# Patient Record
Sex: FEMALE | Race: WHITE | NOT HISPANIC OR LATINO | Employment: OTHER | URBAN - NONMETROPOLITAN AREA
[De-identification: names, ages, dates, MRNs, and addresses within clinical notes are randomized per-mention and may not be internally consistent; named-entity substitution may affect disease eponyms.]

---

## 2017-03-08 ENCOUNTER — HOSPITAL ENCOUNTER (OUTPATIENT)
Dept: LAB | Facility: MEDICAL CENTER | Age: 62
End: 2017-03-08
Attending: FAMILY MEDICINE
Payer: COMMERCIAL

## 2017-03-08 LAB
ALBUMIN SERPL BCP-MCNC: 3.9 G/DL (ref 3.2–4.9)
ALBUMIN/GLOB SERPL: 1.3 G/DL
ALP SERPL-CCNC: 123 U/L (ref 30–99)
ALT SERPL-CCNC: 56 U/L (ref 2–50)
ANION GAP SERPL CALC-SCNC: 6 MMOL/L (ref 0–11.9)
APPEARANCE UR: ABNORMAL
AST SERPL-CCNC: 32 U/L (ref 12–45)
BACTERIA #/AREA URNS HPF: ABNORMAL /HPF
BILIRUB SERPL-MCNC: 0.3 MG/DL (ref 0.1–1.5)
BILIRUB UR QL STRIP.AUTO: NEGATIVE
BUN SERPL-MCNC: 21 MG/DL (ref 8–22)
CALCIUM SERPL-MCNC: 9 MG/DL (ref 8.5–10.5)
CHLORIDE SERPL-SCNC: 104 MMOL/L (ref 96–112)
CHOLEST SERPL-MCNC: 222 MG/DL (ref 100–199)
CO2 SERPL-SCNC: 31 MMOL/L (ref 20–33)
COLOR UR AUTO: YELLOW
CREAT SERPL-MCNC: 0.74 MG/DL (ref 0.5–1.4)
EPITHELIAL CELLS 1715: ABNORMAL /HPF
GLOBULIN SER CALC-MCNC: 3.1 G/DL (ref 1.9–3.5)
GLUCOSE SERPL-MCNC: 128 MG/DL (ref 65–99)
GLUCOSE UR STRIP.AUTO-MCNC: NEGATIVE MG/DL
HDLC SERPL-MCNC: 44 MG/DL
KETONES UR STRIP.AUTO-MCNC: NEGATIVE MG/DL
LDLC SERPL CALC-MCNC: 106 MG/DL
LEUKOCYTE ESTERASE UR QL STRIP.AUTO: NEGATIVE
MICRO URNS: ABNORMAL
NITRITE UR QL STRIP.AUTO: NEGATIVE
PH UR: 6 [PH]
POTASSIUM SERPL-SCNC: 4.3 MMOL/L (ref 3.6–5.5)
PROT SERPL-MCNC: 7 G/DL (ref 6–8.2)
PROT UR QL STRIP: NEGATIVE MG/DL
RBC #/AREA URNS HPF: ABNORMAL /HPF
RBC UR QL AUTO: NEGATIVE
SODIUM SERPL-SCNC: 141 MMOL/L (ref 135–145)
SP GR UR STRIP.AUTO: 1.02
T3FREE SERPL-MCNC: 2.94 PG/ML (ref 2.4–4.2)
T4 FREE SERPL-MCNC: 0.66 NG/DL (ref 0.53–1.43)
TRIGL SERPL-MCNC: 361 MG/DL (ref 0–149)
TSH SERPL DL<=0.005 MIU/L-ACNC: 3.84 UIU/ML (ref 0.3–3.7)
WBC #/AREA URNS HPF: ABNORMAL /HPF

## 2017-03-08 PROCEDURE — 84443 ASSAY THYROID STIM HORMONE: CPT

## 2017-03-08 PROCEDURE — 84481 FREE ASSAY (FT-3): CPT

## 2017-03-08 PROCEDURE — 81001 URINALYSIS AUTO W/SCOPE: CPT

## 2017-03-08 PROCEDURE — 36415 COLL VENOUS BLD VENIPUNCTURE: CPT

## 2017-03-08 PROCEDURE — 80061 LIPID PANEL: CPT

## 2017-03-08 PROCEDURE — 84439 ASSAY OF FREE THYROXINE: CPT

## 2017-03-08 PROCEDURE — 80053 COMPREHEN METABOLIC PANEL: CPT

## 2017-03-24 ENCOUNTER — HOSPITAL ENCOUNTER (OUTPATIENT)
Dept: LAB | Facility: MEDICAL CENTER | Age: 62
End: 2017-03-24
Attending: FAMILY MEDICINE
Payer: COMMERCIAL

## 2017-03-24 LAB
EST. AVERAGE GLUCOSE BLD GHB EST-MCNC: 126 MG/DL
HBA1C MFR BLD: 6 % (ref 0–5.6)

## 2017-03-24 PROCEDURE — 83036 HEMOGLOBIN GLYCOSYLATED A1C: CPT | Mod: GA

## 2017-03-24 PROCEDURE — 36415 COLL VENOUS BLD VENIPUNCTURE: CPT | Mod: GA

## 2017-06-02 ENCOUNTER — HOSPITAL ENCOUNTER (OUTPATIENT)
Dept: LAB | Facility: MEDICAL CENTER | Age: 62
End: 2017-06-02
Attending: PHYSICIAN ASSISTANT
Payer: COMMERCIAL

## 2017-06-02 ENCOUNTER — HOSPITAL ENCOUNTER (OUTPATIENT)
Dept: LAB | Facility: MEDICAL CENTER | Age: 62
End: 2017-06-02
Attending: INTERNAL MEDICINE
Payer: COMMERCIAL

## 2017-06-02 LAB
FERRITIN SERPL-MCNC: 164.7 NG/ML (ref 10–291)
IRON SATN MFR SERPL: 27 % (ref 15–55)
IRON SERPL-MCNC: 98 UG/DL (ref 40–170)
TIBC SERPL-MCNC: 358 UG/DL (ref 250–450)
TRANSFERRIN SERPL-MCNC: 249 MG/DL (ref 200–370)
VIT B12 SERPL-MCNC: 367 PG/ML (ref 211–911)

## 2017-06-02 PROCEDURE — 82607 VITAMIN B-12: CPT

## 2017-06-02 PROCEDURE — 83921 ORGANIC ACID SINGLE QUANT: CPT

## 2017-06-04 LAB
A1AT SERPL-MCNC: 129 MG/DL (ref 90–200)
CERULOPLASMIN SERPL-MCNC: 28 MG/DL (ref 17–54)
MITOCHONDRIA M2 IGG SER-ACNC: 4.8 UNITS (ref 0–20)
SMA IGG SER-ACNC: 15 UNITS (ref 0–19)

## 2017-06-05 LAB
IGA SERPL-MCNC: 207 MG/DL (ref 68–408)
NUCLEAR IGG SER QL IA: NORMAL
TTG IGG SER IA-ACNC: 2 U/ML (ref 0–5)

## 2017-06-06 LAB — METHYLMALONATE SERPL-SCNC: 0.11 UMOL/L (ref 0–0.4)

## 2017-10-16 ENCOUNTER — OFFICE VISIT (OUTPATIENT)
Dept: URGENT CARE | Facility: PHYSICIAN GROUP | Age: 62
End: 2017-10-16
Payer: COMMERCIAL

## 2017-10-16 VITALS
HEART RATE: 72 BPM | TEMPERATURE: 98.5 F | HEIGHT: 64 IN | SYSTOLIC BLOOD PRESSURE: 130 MMHG | DIASTOLIC BLOOD PRESSURE: 72 MMHG | WEIGHT: 253.4 LBS | RESPIRATION RATE: 16 BRPM | BODY MASS INDEX: 43.26 KG/M2 | OXYGEN SATURATION: 94 %

## 2017-10-16 DIAGNOSIS — J01.00 ACUTE MAXILLARY SINUSITIS, RECURRENCE NOT SPECIFIED: ICD-10-CM

## 2017-10-16 DIAGNOSIS — R11.2 NAUSEA AND VOMITING, INTRACTABILITY OF VOMITING NOT SPECIFIED, UNSPECIFIED VOMITING TYPE: ICD-10-CM

## 2017-10-16 PROCEDURE — 99214 OFFICE O/P EST MOD 30 MIN: CPT | Performed by: PHYSICIAN ASSISTANT

## 2017-10-16 RX ORDER — ONDANSETRON 4 MG/1
4 TABLET, ORALLY DISINTEGRATING ORAL EVERY 8 HOURS PRN
Qty: 20 TAB | Refills: 0 | Status: SHIPPED | OUTPATIENT
Start: 2017-10-16 | End: 2019-05-29

## 2017-10-16 RX ORDER — LUBIPROSTONE 24 UG/1
24 CAPSULE ORAL 2 TIMES DAILY WITH MEALS
COMMUNITY
End: 2021-10-08 | Stop reason: SDUPTHER

## 2017-10-16 RX ORDER — CEFDINIR 300 MG/1
300 CAPSULE ORAL 2 TIMES DAILY
Qty: 20 CAP | Refills: 0 | Status: SHIPPED | OUTPATIENT
Start: 2017-10-16 | End: 2017-10-26

## 2017-10-16 RX ORDER — LOSARTAN POTASSIUM 50 MG/1
50 TABLET ORAL DAILY
COMMUNITY
End: 2019-05-29

## 2017-10-16 RX ORDER — BACLOFEN 20 MG/1
20 TABLET ORAL 3 TIMES DAILY
COMMUNITY
End: 2021-07-01

## 2017-10-16 ASSESSMENT — PAIN SCALES - GENERAL: PAINLEVEL: 6=MODERATE PAIN

## 2017-10-16 NOTE — PROGRESS NOTES
Chief Complaint   Patient presents with   • Sinus Problem   • Emesis       HISTORY OF PRESENT ILLNESS: Patient is a 61 y.o. female who presents today for the following:    Patient comes in for evaluation of a one-week history of green sinus drainage. Patient reports postnasal drip that is causing nausea and vomiting. Patient has not been able to hold any food down since this started 3 or 4 days ago. She denies any fevers, sore throat, ear pain, cough. She has been using Mucinex with some relief of her symptoms. She denies any diarrhea or abdominal pain.    Patient Active Problem List    Diagnosis Date Noted   • HTN (hypertension) 05/19/2011   • Hypothyroidism 05/19/2011   • Obesity 05/19/2011   • Elevated liver enzymes 05/19/2011   • Chronic obstructive pulmonary disease (COPD) (CMS-HCC) 09/25/2010   • Tobacco user 09/24/2010   • Bipolar 2 disorder (CMS-HCC) 09/24/2010   • Menopause 09/24/2010   • DDD (degenerative disc disease), lumbar 09/24/2010   • Hyperlipidemia 09/24/2010       Allergies:Codeine and Sulfa drugs    Current Outpatient Prescriptions Ordered in [facility]   Medication Sig Dispense Refill   • baclofen (LIORESAL) 20 MG tablet Take 20 mg by mouth 3 times a day.     • lubiprostone (AMITIZA) 24 MCG capsule Take 24 mcg by mouth 2 times a day, with meals.     • losartan (COZAAR) 50 MG Tab Take 50 mg by mouth every day.     • ondansetron (ZOFRAN ODT) 4 MG TABLET DISPERSIBLE Take 1 Tab by mouth every 8 hours as needed for Nausea/Vomiting. 20 Tab 0   • cefdinir (OMNICEF) 300 MG Cap Take 1 Cap by mouth 2 times a day for 10 days. 20 Cap 0   • trazodone (DESYREL) 100 MG Tab Take 100 mg by mouth every evening.     • atorvastatin (LIPITOR) 80 MG tablet Take 80 mg by mouth every evening.     • risperidone (RISPERDAL) 2 MG Tab Take 2 mg by mouth 2 times a day.     • carbamazepine (TEGRETOL) 200 MG Tab Take 200 mg by mouth 3 times a day.     • omeprazole (PRILOSEC) 20 MG Tablet Delayed Response delayed-release tablet Take   by mouth.     • gabapentin (NEURONTIN) 300 MG Cap Take 300 mg by mouth 3 times a day.     • prednisoLONE acetate (PRED FORTE) 1 % Suspension Place 1 Drop in both eyes 4 times a day.     • ondansetron (ZOFRAN ODT) 4 MG TBDP Take 1 Tab by mouth every 8 hours as needed for Nausea/Vomiting. 10 Tab 0   • levofloxacin (LEVAQUIN) 500 MG tablet Take 1 Tab by mouth every day. 7 Tab 0   • fluticasone (FLONASE) 50 MCG/ACT nasal spray Spray 2 Sprays in nose every day. Each Nostril 16 g 0   • benztropine (COGENTIN) 2 MG TABS Take 2 mg by mouth 2 times a day.     • albuterol (PROVENTIL) 90 MCG/ACT AERS Inhale 2 Puffs by mouth every 6 hours as needed for Shortness of Breath. 1 Inhaler 3   • ziprasidone (GEODON) 60 MG CAPS Take 40 mg by mouth every day. 2 am 1 at noon 2 at dinner       No current Epic-ordered facility-administered medications on file.        Past Medical History:   Diagnosis Date   • Arthritis    • Atrial fibrillation (CMS-HCC)     intermittent   • Bipolar affective disorder (CMS-HCC)    • Breath shortness    • Hyperlipidemia    • Indigestion    • Lumbar disc disease    • Menopause    • Sinusitis    • Sleep apnea     CPAP and O2 at night       Social History   Substance Use Topics   • Smoking status: Current Every Day Smoker     Packs/day: 2.00     Years: 40.00     Types: Cigarettes   • Smokeless tobacco: Never Used   • Alcohol use No       Family Status   Relation Status   • Mother     parkinsons   • Father     cancer - metast   • Sister Alive     Family History   Problem Relation Age of Onset   • Heart Disease Mother    • Cancer Father      lung   • GI Sister      cirrhosis       ROS:    Review of Systems   Constitutional: Negative for fever, chills, weight loss and malaise/fatigue.   HENT: Negative for ear pain, nosebleeds, sore throat and neck pain.    Eyes: Negative for blurred vision.   Respiratory: Negative for cough, sputum production, shortness of breath and wheezing.    Cardiovascular:  "Negative for chest pain, palpitations, orthopnea and leg swelling.   Gastrointestinal: Negative for heartburn,  abdominal pain.   Genitourinary: Negative for dysuria, urgency and frequency.       Exam:  Blood pressure 130/72, pulse 72, temperature 36.9 °C (98.5 °F), resp. rate 16, height 1.626 m (5' 4\"), weight 114.9 kg (253 lb 6.4 oz), last menstrual period 09/24/2010, SpO2 94 %.  General: Well developed, well nourished. No distress.  HEENT: Conjunctiva clear, lids without ptosis, PERRL/EOMI. Ears normal shape and contour, canals are clear bilaterally, tympanic membranes are benign. Nasal mucosa mildly edematous bilaterally. Oropharynx is without erythema, edema or exudates.   Pulmonary: Clear to ausculation and percussion.  Normal effort. No rales, ronchi, or wheezing.   Cardiovascular: Regular rate and rhythm without murmur. No edema.   Neurologic: Grossly nonfocal.  Lymph: No cervical lymphadenopathy noted.  Skin: Warm, dry, good turgor. No rashes in visible areas.   Psych: Normal mood. Alert and oriented x3. Judgment and insight is normal.    Assessment/Plan:  Discussed likely viral etiology of the sinusitis. The nausea is likely coming from the postnasal drip. Use all medication as directed. Discussed appropriate over-the-counter symptomatic medication, and when to return to clinic. Contingent antibiotic prescription given to patient to fill upon meeting criteria of guidelines discussed.   1. Acute maxillary sinusitis, recurrence not specified  cefdinir (OMNICEF) 300 MG Cap   2. Nausea and vomiting, intractability of vomiting not specified, unspecified vomiting type  ondansetron (ZOFRAN ODT) 4 MG TABLET DISPERSIBLE       "

## 2017-10-16 NOTE — PATIENT INSTRUCTIONS
"Smoking Cessation, Tips for Success  If you are ready to quit smoking, congratulations! You have chosen to help yourself be healthier. Cigarettes bring nicotine, tar, carbon monoxide, and other irritants into your body. Your lungs, heart, and blood vessels will be able to work better without these poisons. There are many different ways to quit smoking. Nicotine gum, nicotine patches, a nicotine inhaler, or nicotine nasal spray can help with physical craving. Hypnosis, support groups, and medicines help break the habit of smoking.  WHAT THINGS CAN I DO TO MAKE QUITTING EASIER?   Here are some tips to help you quit for good:  · Pick a date when you will quit smoking completely. Tell all of your friends and family about your plan to quit on that date.  · Do not try to slowly cut down on the number of cigarettes you are smoking. Pick a quit date and quit smoking completely starting on that day.  · Throw away all cigarettes.    · Clean and remove all ashtrays from your home, work, and car.  · On a card, write down your reasons for quitting. Carry the card with you and read it when you get the urge to smoke.  · Cleanse your body of nicotine. Drink enough water and fluids to keep your urine clear or pale yellow. Do this after quitting to flush the nicotine from your body.  · Learn to predict your moods. Do not let a bad situation be your excuse to have a cigarette. Some situations in your life might tempt you into wanting a cigarette.  · Never have \"just one\" cigarette. It leads to wanting another and another. Remind yourself of your decision to quit.  · Change habits associated with smoking. If you smoked while driving or when feeling stressed, try other activities to replace smoking. Stand up when drinking your coffee. Brush your teeth after eating. Sit in a different chair when you read the paper. Avoid alcohol while trying to quit, and try to drink fewer caffeinated beverages. Alcohol and caffeine may urge you to " "smoke.  · Avoid foods and drinks that can trigger a desire to smoke, such as sugary or spicy foods and alcohol.  · Ask people who smoke not to smoke around you.  · Have something planned to do right after eating or having a cup of coffee. For example, plan to take a walk or exercise.  · Try a relaxation exercise to calm you down and decrease your stress. Remember, you may be tense and nervous for the first 2 weeks after you quit, but this will pass.  · Find new activities to keep your hands busy. Play with a pen, coin, or rubber band. Doodle or draw things on paper.  · Brush your teeth right after eating. This will help cut down on the craving for the taste of tobacco after meals. You can also try mouthwash.    · Use oral substitutes in place of cigarettes. Try using lemon drops, carrots, cinnamon sticks, or chewing gum. Keep them handy so they are available when you have the urge to smoke.  · When you have the urge to smoke, try deep breathing.  · Designate your home as a nonsmoking area.  · If you are a heavy smoker, ask your health care provider about a prescription for nicotine chewing gum. It can ease your withdrawal from nicotine.  · Reward yourself. Set aside the cigarette money you save and buy yourself something nice.  · Look for support from others. Join a support group or smoking cessation program. Ask someone at home or at work to help you with your plan to quit smoking.  · Always ask yourself, \"Do I need this cigarette or is this just a reflex?\" Tell yourself, \"Today, I choose not to smoke,\" or \"I do not want to smoke.\" You are reminding yourself of your decision to quit.  · Do not replace cigarette smoking with electronic cigarettes (commonly called e-cigarettes). The safety of e-cigarettes is unknown, and some may contain harmful chemicals.  · If you relapse, do not give up! Plan ahead and think about what you will do the next time you get the urge to smoke.  HOW WILL I FEEL WHEN I QUIT SMOKING?  You " may have symptoms of withdrawal because your body is used to nicotine (the addictive substance in cigarettes). You may crave cigarettes, be irritable, feel very hungry, cough often, get headaches, or have difficulty concentrating. The withdrawal symptoms are only temporary. They are strongest when you first quit but will go away within 10-14 days. When withdrawal symptoms occur, stay in control. Think about your reasons for quitting. Remind yourself that these are signs that your body is healing and getting used to being without cigarettes. Remember that withdrawal symptoms are easier to treat than the major diseases that smoking can cause.   Even after the withdrawal is over, expect periodic urges to smoke. However, these cravings are generally short lived and will go away whether you smoke or not. Do not smoke!  WHAT RESOURCES ARE AVAILABLE TO HELP ME QUIT SMOKING?  Your health care provider can direct you to community resources or hospitals for support, which may include:  · Group support.  · Education.  · Hypnosis.  · Therapy.     This information is not intended to replace advice given to you by your health care provider. Make sure you discuss any questions you have with your health care provider.     Document Released: 09/15/2005 Document Revised: 01/08/2016 Document Reviewed: 06/05/2014  [employer] Interactive Patient Education ©2016 [employer]

## 2018-01-27 ENCOUNTER — OFFICE VISIT (OUTPATIENT)
Dept: URGENT CARE | Facility: PHYSICIAN GROUP | Age: 63
End: 2018-01-27
Payer: COMMERCIAL

## 2018-01-27 VITALS
TEMPERATURE: 97.3 F | OXYGEN SATURATION: 97 % | SYSTOLIC BLOOD PRESSURE: 144 MMHG | HEIGHT: 64 IN | DIASTOLIC BLOOD PRESSURE: 80 MMHG | HEART RATE: 66 BPM | RESPIRATION RATE: 12 BRPM | BODY MASS INDEX: 43.87 KG/M2 | WEIGHT: 257 LBS

## 2018-01-27 DIAGNOSIS — Z20.828 EXPOSURE TO THE FLU: ICD-10-CM

## 2018-01-27 PROCEDURE — 99214 OFFICE O/P EST MOD 30 MIN: CPT | Performed by: FAMILY MEDICINE

## 2018-01-27 RX ORDER — OSELTAMIVIR PHOSPHATE 75 MG/1
75 CAPSULE ORAL 2 TIMES DAILY
Qty: 10 CAP | Refills: 0 | Status: SHIPPED | OUTPATIENT
Start: 2018-01-27 | End: 2019-05-29

## 2018-01-27 ASSESSMENT — PAIN SCALES - GENERAL: PAINLEVEL: NO PAIN

## 2018-01-27 ASSESSMENT — ENCOUNTER SYMPTOMS
CHILLS: 0
FEVER: 0
SORE THROAT: 0
COUGH: 0
MYALGIAS: 0

## 2018-01-27 NOTE — PROGRESS NOTES
"Subjective:   Herlinda Gill is a 62 y.o. female who presents for Influenza (was exposed to the flu would like a script for Tamiflu)        Influenza   This is a new problem. The current episode started in the past 7 days. The problem occurs constantly. The problem has been unchanged. Pertinent negatives include no chills, congestion, coughing, fever, myalgias or sore throat.     Review of Systems   Constitutional: Negative for chills and fever.   HENT: Negative for congestion and sore throat.    Respiratory: Negative for cough.    Musculoskeletal: Negative for myalgias.     Allergies   Allergen Reactions   • Codeine    • Depakote [Divalproex Sodium]    • Sulfa Drugs Rash      Objective:   /80   Pulse 66   Temp 36.3 °C (97.3 °F)   Resp 12   Ht 1.626 m (5' 4\")   Wt 116.6 kg (257 lb)   LMP 09/24/2010   SpO2 97%   BMI 44.11 kg/m²   Physical Exam   Constitutional: She is oriented to person, place, and time. She appears well-developed and well-nourished. No distress.   HENT:   Head: Normocephalic and atraumatic.   Eyes: Conjunctivae and EOM are normal. Pupils are equal, round, and reactive to light.   Cardiovascular: Normal rate and regular rhythm.    No murmur heard.  Pulmonary/Chest: Effort normal and breath sounds normal. No respiratory distress.   Abdominal: Soft. She exhibits no distension. There is no tenderness.   Neurological: She is alert and oriented to person, place, and time. She has normal reflexes. No sensory deficit.   Skin: Skin is warm and dry.   Psychiatric: She has a normal mood and affect.         Assessment/Plan:   Assessment    1. Exposure to the flu  - oseltamivir (TAMIFLU) 75 MG Cap; Take 1 Cap by mouth 2 times a day.  Dispense: 10 Cap; Refill: 0  Differential diagnosis, natural history, supportive care, and indications for immediate follow-up discussed.       "

## 2018-08-14 ENCOUNTER — HOSPITAL ENCOUNTER (OUTPATIENT)
Dept: RADIOLOGY | Facility: MEDICAL CENTER | Age: 63
End: 2018-08-14

## 2018-08-24 ENCOUNTER — OFFICE VISIT (OUTPATIENT)
Dept: PULMONOLOGY | Facility: HOSPICE | Age: 63
End: 2018-08-24
Payer: COMMERCIAL

## 2018-08-24 VITALS
TEMPERATURE: 98.1 F | HEART RATE: 60 BPM | RESPIRATION RATE: 16 BRPM | HEIGHT: 64 IN | DIASTOLIC BLOOD PRESSURE: 80 MMHG | BODY MASS INDEX: 45.07 KG/M2 | SYSTOLIC BLOOD PRESSURE: 128 MMHG | WEIGHT: 264 LBS | OXYGEN SATURATION: 98 %

## 2018-08-24 DIAGNOSIS — G47.33 OSA (OBSTRUCTIVE SLEEP APNEA): ICD-10-CM

## 2018-08-24 DIAGNOSIS — J44.9 CHRONIC OBSTRUCTIVE PULMONARY DISEASE, UNSPECIFIED COPD TYPE (HCC): ICD-10-CM

## 2018-08-24 DIAGNOSIS — R09.02 HYPOXEMIA: ICD-10-CM

## 2018-08-24 PROCEDURE — 99204 OFFICE O/P NEW MOD 45 MIN: CPT | Performed by: INTERNAL MEDICINE

## 2018-08-24 RX ORDER — HYDROCODONE BITARTRATE AND ACETAMINOPHEN 10; 325 MG/1; MG/1
TABLET ORAL
Refills: 0 | COMMUNITY
Start: 2018-06-28 | End: 2021-07-01

## 2018-08-24 RX ORDER — LOSARTAN POTASSIUM 100 MG/1
100 TABLET ORAL DAILY
Refills: 3 | COMMUNITY
Start: 2018-07-13 | End: 2023-09-15

## 2018-08-24 RX ORDER — LEVOTHYROXINE SODIUM 0.03 MG/1
TABLET ORAL
Refills: 0 | COMMUNITY
Start: 2018-07-09 | End: 2021-07-01

## 2018-08-24 RX ORDER — OMEGA-3-ACID ETHYL ESTERS 1 G/1
CAPSULE, LIQUID FILLED ORAL
Refills: 2 | COMMUNITY
Start: 2018-07-16 | End: 2019-05-29

## 2018-08-24 RX ORDER — LEVOTHYROXINE SODIUM 0.07 MG/1
TABLET ORAL
Refills: 3 | COMMUNITY
Start: 2018-08-12 | End: 2021-08-02

## 2018-08-24 RX ORDER — ALBUTEROL SULFATE 90 UG/1
2 AEROSOL, METERED RESPIRATORY (INHALATION) EVERY 4 HOURS PRN
Qty: 1 INHALER | Refills: 11 | Status: SHIPPED | OUTPATIENT
Start: 2018-08-24 | End: 2024-02-21 | Stop reason: SDUPTHER

## 2018-08-24 RX ORDER — GABAPENTIN 400 MG/1
CAPSULE ORAL
Refills: 0 | COMMUNITY
Start: 2018-07-13 | End: 2021-07-01

## 2018-08-24 RX ORDER — OMEPRAZOLE 40 MG/1
CAPSULE, DELAYED RELEASE ORAL
Refills: 0 | COMMUNITY
Start: 2018-07-17 | End: 2021-07-01

## 2018-08-24 RX ORDER — CITALOPRAM HYDROBROMIDE 10 MG/1
TABLET ORAL
Refills: 1 | COMMUNITY
Start: 2018-06-12 | End: 2021-07-01

## 2018-08-24 NOTE — LETTER
Shlomo Bautista M.D.  [facility] Pulmonary Medicine   [address] [place] [address] [place] [address]  Phone: [phone] - Fax: [phone]           Encounter Date: 8/24/2018  Provider: Shlomo Bautista M.D.  Location of Care: [facility] PULMONARY MEDICINE      Patient:   Herlinda Gill   MR Number: [id number]   YOB: 1955     PROGRESS NOTE:  Chief Complaint   Patient presents with   • New Patient     Chronic Bronchitis       HPI:  The patient is a 62-year-old woman with a chief complaint of shortness of breath and oxygen dependence.  She said she had been on nocturnal oxygen for several years.  However, she was admitted to [facility] in December 2017.  Since that time she has been on supplemental oxygen 24 7.  She has been treated with Trelegy and albuterol.  At this time she is not taking the Trelegy.  She is chronically short of breath.  She does not complain of cough or sputum production.  No history of hemoptysis.  She does wheeze several times per week.  Patient has previously been followed by Dr. Rahman in [place].  The patient has been diagnosed with obstructive sleep apnea and is on CPAP with supplemental oxygen at night.  The patient is a longtime smoker of 2 packs of cigarettes per day for over 60 years.  She continues to smoke.  An echocardiogram in 2011 demonstrated grade 2 diastolic dysfunction.  Pulmonary artery pressures were not estimated.  A recent chest x-ray on 6/19 2018 at [facility] showed no acute problems.  A chest CT done in December 2017 at [facility] demonstrated multiple calcified nodules in the lingula and some atelectasis.  No mass lesions were noted.  The patient does request an oxygen conserving device for her oxygen tanks at home.  She does have an oxygen concentrator and a portable oxygen concentrator.    Past Medical History:   Diagnosis Date   • Allergic rhinitis    • Arthritis    • Atrial  fibrillation ([facility])     intermittent   • Back pain    • Bipolar affective disorder ([facility])    • Breath shortness    • Bronchitis    • Chest tightness    • Chickenpox    • Constipation    • COPD (chronic obstructive pulmonary disease) ([facility])    • Daytime sleepiness    • Depression    • Hyperlipidemia    • Indigestion    • Lumbar disc disease    • Menopause    • Morning headache    • Nasal drainage    • Obesity    • Pneumonia    • Pulmonary emphysema ([facility])    • Shortness of breath    • Sinusitis    • Sleep apnea     CPAP and O2 at night   • Wears glasses    • Wheezing        ROS:   Constitutional: Denies fevers, chills, night sweats, fatigue or weight loss  Eyes: Denies vision loss, pain, drainage, double vision  Ears, Nose, Throat: Denies earache, tinnitus, hoarseness  Cardiovascular: Denies chest pain, tightness, palpitations  Respiratory: See HPI  Sleep: See HPI  GI: Denies abdominal pain, nausea, vomiting, diarrhea  : Denies frequent urination, hematuria, painful urination  Musculoskeletal: Denies back pain, painful joints, sore muscles  Neurological: Denies headaches, seizures  Skin: Denies rashes, color changes  Psychiatric: Denies depression or thoughts of suicide.  History of bipolar disease  Hematologic: Denies bleeding tendency or clotting tendency  Allergic/Immunologic: Denies rhinitis, skin sensitivity    Social History     Social History   • Marital status:      Spouse name: N/A   • Number of children: N/A   • Years of education: N/A     Occupational History   • Not on file.     Social History Main Topics   • Smoking status: Current Every Day Smoker     Packs/day: 2.00     Years: 40.00     Types: Cigarettes   • Smokeless tobacco: Never Used   • Alcohol use No   • Drug use: No   • Sexual activity: Yes     Other Topics Concern   • Not on file     Social History Narrative   • No narrative on file     Codeine; Depakote [divalproex sodium]; and Sulfa drugs  Current Outpatient Prescriptions on File Prior  "to Visit   Medication Sig Dispense Refill   • lubiprostone (AMITIZA) 24 MCG capsule Take 24 mcg by mouth 2 times a day, with meals.     • losartan (COZAAR) 50 MG Tab Take 50 mg by mouth every day.     • trazodone (DESYREL) 100 MG Tab Take 100 mg by mouth every evening.     • atorvastatin (LIPITOR) 80 MG tablet Take 80 mg by mouth every evening.     • risperidone (RISPERDAL) 2 MG Tab Take 2 mg by mouth 2 times a day.     • carbamazepine (TEGRETOL) 200 MG Tab Take 200 mg by mouth 3 times a day.     • gabapentin (NEURONTIN) 300 MG Cap Take 300 mg by mouth 3 times a day.     • fluticasone (FLONASE) 50 MCG/ACT nasal spray Spray 2 Sprays in nose every day. Each Nostril 16 g 0   • albuterol (PROVENTIL) 90 MCG/ACT AERS Inhale 2 Puffs by mouth every 6 hours as needed for Shortness of Breath. 1 Inhaler 3   • ziprasidone (GEODON) 60 MG CAPS Take 40 mg by mouth every day. 2 am 1 at noon 2 at dinner     • oseltamivir (TAMIFLU) 75 MG Cap Take 1 Cap by mouth 2 times a day. 10 Cap 0   • baclofen (LIORESAL) 20 MG tablet Take 20 mg by mouth 3 times a day.     • ondansetron (ZOFRAN ODT) 4 MG TABLET DISPERSIBLE Take 1 Tab by mouth every 8 hours as needed for Nausea/Vomiting. 20 Tab 0   • prednisoLONE acetate (PRED FORTE) 1 % Suspension Place 1 Drop in both eyes 4 times a day.     • omeprazole (PRILOSEC) 20 MG Tablet Delayed Response delayed-release tablet Take  by mouth.     • ondansetron (ZOFRAN ODT) 4 MG TBDP Take 1 Tab by mouth every 8 hours as needed for Nausea/Vomiting. 10 Tab 0   • levofloxacin (LEVAQUIN) 500 MG tablet Take 1 Tab by mouth every day. 7 Tab 0   • benztropine (COGENTIN) 2 MG TABS Take 2 mg by mouth 2 times a day.       No current facility-administered medications on file prior to visit.      Blood pressure 128/80, pulse 60, temperature 36.7 °C (98.1 °F), resp. rate 16, height 1.626 m (5' 4\"), weight 119.7 kg (264 lb), last menstrual period 09/24/2010, SpO2 98 %.  Family History   Problem Relation Age of Onset   • " Heart Disease Mother    • Cancer Father         lung   • GI Sister         cirrhosis       Physical Exam:  No distress at rest on supplemental oxygen  HEENT: PERRLA, EOMI, no scleral icterus, no nasal or oral lesions  Neck: No thyromegaly, no adenopathy, no bruits  [place]atti: Grade III  Lungs: Distant breath sounds, no wheezes or crackles  Heart: Regular rate and rhythm, no gallops or murmurs  Abdomen: Soft, benign, no organomegaly  Extremities: No clubbing, cyanosis, or edema  Neurologic: Cranial nerve, motor, and sensory exam are normal    1. Chronic obstructive pulmonary disease, unspecified COPD type (HCC)    2. Hypoxemia    3. MARTITA (obstructive sleep apnea)        Clinically this woman has significant chronic obstructive pulmonary disease.  We will continue her current medications and oxygen as well as her CPAP  We will request an oxygen conserving device for her  We will request records from [facility] and Dr. Rahman's office  Patient has had previous pulmonary function testing at [facility]  She will return in several months for repeat evaluation      Electronically signed by Shlomo Bautista M.D.  on 08/26/18    No Recipients

## 2018-08-26 NOTE — PROGRESS NOTES
Chief Complaint   Patient presents with   • New Patient     Chronic Bronchitis       HPI:  The patient is a 62-year-old woman with a chief complaint of shortness of breath and oxygen dependence.  She said she had been on nocturnal oxygen for several years.  However, she was admitted to [facility] in December 2017.  Since that time she has been on supplemental oxygen 24 7.  She has been treated with Trelegy and albuterol.  At this time she is not taking the Trelegy.  She is chronically short of breath.  She does not complain of cough or sputum production.  No history of hemoptysis.  She does wheeze several times per week.  Patient has previously been followed by Dr. Rahman in [place].  The patient has been diagnosed with obstructive sleep apnea and is on CPAP with supplemental oxygen at night.  The patient is a longtime smoker of 2 packs of cigarettes per day for over 60 years.  She continues to smoke.  An echocardiogram in 2011 demonstrated grade 2 diastolic dysfunction.  Pulmonary artery pressures were not estimated.  A recent chest x-ray on 6/19 2018 at [facility] showed no acute problems.  A chest CT done in December 2017 at [facility] demonstrated multiple calcified nodules in the lingula and some atelectasis.  No mass lesions were noted.  The patient does request an oxygen conserving device for her oxygen tanks at home.  She does have an oxygen concentrator and a portable oxygen concentrator.    Past Medical History:   Diagnosis Date   • Allergic rhinitis    • Arthritis    • Atrial fibrillation (HCC)     intermittent   • Back pain    • Bipolar affective disorder (HCC)    • Breath shortness    • Bronchitis    • Chest tightness    • Chickenpox    • Constipation    • COPD (chronic obstructive pulmonary disease) (HCC)    • Daytime sleepiness    • Depression    • Hyperlipidemia    • Indigestion    • Lumbar disc disease    • Menopause    • Morning headache    • Nasal drainage    •  Obesity    • Pneumonia    • Pulmonary emphysema (HCC)    • Shortness of breath    • Sinusitis    • Sleep apnea     CPAP and O2 at night   • Wears glasses    • Wheezing        ROS:   Constitutional: Denies fevers, chills, night sweats, fatigue or weight loss  Eyes: Denies vision loss, pain, drainage, double vision  Ears, Nose, Throat: Denies earache, tinnitus, hoarseness  Cardiovascular: Denies chest pain, tightness, palpitations  Respiratory: See HPI  Sleep: See HPI  GI: Denies abdominal pain, nausea, vomiting, diarrhea  : Denies frequent urination, hematuria, painful urination  Musculoskeletal: Denies back pain, painful joints, sore muscles  Neurological: Denies headaches, seizures  Skin: Denies rashes, color changes  Psychiatric: Denies depression or thoughts of suicide.  History of bipolar disease  Hematologic: Denies bleeding tendency or clotting tendency  Allergic/Immunologic: Denies rhinitis, skin sensitivity    Social History     Social History   • Marital status:      Spouse name: N/A   • Number of children: N/A   • Years of education: N/A     Occupational History   • Not on file.     Social History Main Topics   • Smoking status: Current Every Day Smoker     Packs/day: 2.00     Years: 40.00     Types: Cigarettes   • Smokeless tobacco: Never Used   • Alcohol use No   • Drug use: No   • Sexual activity: Yes     Other Topics Concern   • Not on file     Social History Narrative   • No narrative on file     Codeine; Depakote [divalproex sodium]; and Sulfa drugs  Current Outpatient Prescriptions on File Prior to Visit   Medication Sig Dispense Refill   • lubiprostone (AMITIZA) 24 MCG capsule Take 24 mcg by mouth 2 times a day, with meals.     • losartan (COZAAR) 50 MG Tab Take 50 mg by mouth every day.     • trazodone (DESYREL) 100 MG Tab Take 100 mg by mouth every evening.     • atorvastatin (LIPITOR) 80 MG tablet Take 80 mg by mouth every evening.     • risperidone (RISPERDAL) 2 MG Tab Take 2 mg by  "mouth 2 times a day.     • carbamazepine (TEGRETOL) 200 MG Tab Take 200 mg by mouth 3 times a day.     • gabapentin (NEURONTIN) 300 MG Cap Take 300 mg by mouth 3 times a day.     • fluticasone (FLONASE) 50 MCG/ACT nasal spray Spray 2 Sprays in nose every day. Each Nostril 16 g 0   • albuterol (PROVENTIL) 90 MCG/ACT AERS Inhale 2 Puffs by mouth every 6 hours as needed for Shortness of Breath. 1 Inhaler 3   • ziprasidone (GEODON) 60 MG CAPS Take 40 mg by mouth every day. 2 am 1 at noon 2 at dinner     • oseltamivir (TAMIFLU) 75 MG Cap Take 1 Cap by mouth 2 times a day. 10 Cap 0   • baclofen (LIORESAL) 20 MG tablet Take 20 mg by mouth 3 times a day.     • ondansetron (ZOFRAN ODT) 4 MG TABLET DISPERSIBLE Take 1 Tab by mouth every 8 hours as needed for Nausea/Vomiting. 20 Tab 0   • prednisoLONE acetate (PRED FORTE) 1 % Suspension Place 1 Drop in both eyes 4 times a day.     • omeprazole (PRILOSEC) 20 MG Tablet Delayed Response delayed-release tablet Take  by mouth.     • ondansetron (ZOFRAN ODT) 4 MG TBDP Take 1 Tab by mouth every 8 hours as needed for Nausea/Vomiting. 10 Tab 0   • levofloxacin (LEVAQUIN) 500 MG tablet Take 1 Tab by mouth every day. 7 Tab 0   • benztropine (COGENTIN) 2 MG TABS Take 2 mg by mouth 2 times a day.       No current facility-administered medications on file prior to visit.      Blood pressure 128/80, pulse 60, temperature 36.7 °C (98.1 °F), resp. rate 16, height 1.626 m (5' 4\"), weight 119.7 kg (264 lb), last menstrual period 09/24/2010, SpO2 98 %.  Family History   Problem Relation Age of Onset   • Heart Disease Mother    • Cancer Father         lung   • GI Sister         cirrhosis       Physical Exam:  No distress at rest on supplemental oxygen  HEENT: PERRLA, EOMI, no scleral icterus, no nasal or oral lesions  Neck: No thyromegaly, no adenopathy, no bruits  Mallampatti: Grade III  Lungs: Distant breath sounds, no wheezes or crackles  Heart: Regular rate and rhythm, no gallops or " murmurs  Abdomen: Soft, benign, no organomegaly  Extremities: No clubbing, cyanosis, or edema  Neurologic: Cranial nerve, motor, and sensory exam are normal    1. Chronic obstructive pulmonary disease, unspecified COPD type (HCC)    2. Hypoxemia    3. MARTITA (obstructive sleep apnea)        Clinically this woman has significant chronic obstructive pulmonary disease.  We will continue her current medications and oxygen as well as her CPAP  We will request an oxygen conserving device for her  We will request records from [facility] and Dr. Rahman's office  Patient has had previous pulmonary function testing at [facility]  She will return in several months for repeat evaluation

## 2018-09-14 ENCOUNTER — HOSPITAL ENCOUNTER (OUTPATIENT)
Facility: MEDICAL CENTER | Age: 63
End: 2018-09-14
Attending: NURSE PRACTITIONER
Payer: COMMERCIAL

## 2018-09-14 ENCOUNTER — OFFICE VISIT (OUTPATIENT)
Dept: URGENT CARE | Facility: PHYSICIAN GROUP | Age: 63
End: 2018-09-14
Payer: COMMERCIAL

## 2018-09-14 VITALS
TEMPERATURE: 97.6 F | SYSTOLIC BLOOD PRESSURE: 142 MMHG | DIASTOLIC BLOOD PRESSURE: 86 MMHG | RESPIRATION RATE: 16 BRPM | HEIGHT: 64 IN | BODY MASS INDEX: 46.95 KG/M2 | OXYGEN SATURATION: 97 % | WEIGHT: 275 LBS | HEART RATE: 66 BPM

## 2018-09-14 DIAGNOSIS — M54.50 ACUTE LEFT-SIDED LOW BACK PAIN WITHOUT SCIATICA: ICD-10-CM

## 2018-09-14 LAB
APPEARANCE UR: NORMAL
BILIRUB UR STRIP-MCNC: NORMAL MG/DL
COLOR UR AUTO: YELLOW
GLUCOSE UR STRIP.AUTO-MCNC: NORMAL MG/DL
KETONES UR STRIP.AUTO-MCNC: NORMAL MG/DL
LEUKOCYTE ESTERASE UR QL STRIP.AUTO: NEGATIVE
NITRITE UR QL STRIP.AUTO: NEGATIVE
PH UR STRIP.AUTO: 7 [PH] (ref 5–8)
PROT UR QL STRIP: NEGATIVE MG/DL
RBC UR QL AUTO: NEGATIVE
SP GR UR STRIP.AUTO: 1.01
UROBILINOGEN UR STRIP-MCNC: 1 MG/DL

## 2018-09-14 PROCEDURE — 81002 URINALYSIS NONAUTO W/O SCOPE: CPT | Performed by: NURSE PRACTITIONER

## 2018-09-14 PROCEDURE — 99213 OFFICE O/P EST LOW 20 MIN: CPT | Performed by: NURSE PRACTITIONER

## 2018-09-14 PROCEDURE — 87086 URINE CULTURE/COLONY COUNT: CPT

## 2018-09-14 ASSESSMENT — ENCOUNTER SYMPTOMS
ABDOMINAL PAIN: 0
DIZZINESS: 0
DIARRHEA: 0
FLANK PAIN: 0
CHILLS: 0
VOMITING: 0
HEADACHES: 0
ORTHOPNEA: 0
NAUSEA: 0
BACK PAIN: 1
FEVER: 0

## 2018-09-14 NOTE — PROGRESS NOTES
Subjective:      Herlinda Gill is a 62 y.o. female who presents with Back Pain (L lower back x3-4d)            HPI New problem. 62 year old female with left lower back pain and urinary frequency as well as odor. She denies fever, chills, blood in urine, abdominal pain or nausea. She has not taken any medication for this issue.  Codeine; Depakote [divalproex sodium]; and Sulfa drugs  Current Outpatient Prescriptions on File Prior to Visit   Medication Sig Dispense Refill   • Fluticasone-Umeclidin-Vilant (TRELEGY ELLIPTA) 100-62.5-25 MCG/INH AEROSOL POWDER, BREATH ACTIVATED Inhale 1 Puff by mouth every day. 1 Each 11   • albuterol (PROAIR HFA) 108 (90 Base) MCG/ACT Aero Soln inhalation aerosol Inhale 2 Puffs by mouth every four hours as needed for Shortness of Breath. 1 Inhaler 11   • citalopram (CELEXA) 10 MG tablet TK 1 T PO  QAM  1   • gabapentin (NEURONTIN) 400 MG Cap TK 1 C PO TID  0   • HYDROcodone/acetaminophen (NORCO)  MG Tab   0   • levothyroxine (SYNTHROID) 75 MCG Tab TK 1 T PO QD  3   • levothyroxine (SYNTHROID) 25 MCG Tab TK 1 T PO QD  0   • losartan (COZAAR) 100 MG Tab TK 1 T PO QD  3   • omega-3 acid ethyl esters (LOVAZA) 1 GM capsule TK 2 CS PO BID  2   • omeprazole (PRILOSEC) 40 MG delayed-release capsule TK 1 C PO BID  0   • oseltamivir (TAMIFLU) 75 MG Cap Take 1 Cap by mouth 2 times a day. 10 Cap 0   • baclofen (LIORESAL) 20 MG tablet Take 20 mg by mouth 3 times a day.     • lubiprostone (AMITIZA) 24 MCG capsule Take 24 mcg by mouth 2 times a day, with meals.     • losartan (COZAAR) 50 MG Tab Take 50 mg by mouth every day.     • ondansetron (ZOFRAN ODT) 4 MG TABLET DISPERSIBLE Take 1 Tab by mouth every 8 hours as needed for Nausea/Vomiting. 20 Tab 0   • prednisoLONE acetate (PRED FORTE) 1 % Suspension Place 1 Drop in both eyes 4 times a day.     • trazodone (DESYREL) 100 MG Tab Take 100 mg by mouth every evening.     • atorvastatin (LIPITOR) 80 MG tablet Take 80 mg by mouth every evening.      • risperidone (RISPERDAL) 2 MG Tab Take 2 mg by mouth 2 times a day.     • carbamazepine (TEGRETOL) 200 MG Tab Take 200 mg by mouth 3 times a day.     • omeprazole (PRILOSEC) 20 MG Tablet Delayed Response delayed-release tablet Take  by mouth.     • gabapentin (NEURONTIN) 300 MG Cap Take 300 mg by mouth 3 times a day.     • ondansetron (ZOFRAN ODT) 4 MG TBDP Take 1 Tab by mouth every 8 hours as needed for Nausea/Vomiting. 10 Tab 0   • levofloxacin (LEVAQUIN) 500 MG tablet Take 1 Tab by mouth every day. 7 Tab 0   • fluticasone (FLONASE) 50 MCG/ACT nasal spray Spray 2 Sprays in nose every day. Each Nostril 16 g 0   • benztropine (COGENTIN) 2 MG TABS Take 2 mg by mouth 2 times a day.     • albuterol (PROVENTIL) 90 MCG/ACT AERS Inhale 2 Puffs by mouth every 6 hours as needed for Shortness of Breath. 1 Inhaler 3   • ziprasidone (GEODON) 60 MG CAPS Take 40 mg by mouth every day. 2 am 1 at noon 2 at dinner       No current facility-administered medications on file prior to visit.      Social History     Social History   • Marital status:      Spouse name: N/A   • Number of children: N/A   • Years of education: N/A     Occupational History   • Not on file.     Social History Main Topics   • Smoking status: Current Every Day Smoker     Packs/day: 2.00     Years: 40.00     Types: Cigarettes   • Smokeless tobacco: Never Used   • Alcohol use No   • Drug use: No   • Sexual activity: Yes     Other Topics Concern   • Not on file     Social History Narrative   • No narrative on file     family history includes Cancer in her father; GI in her sister; Heart Disease in her mother.      Review of Systems   Constitutional: Negative for chills and fever.   Cardiovascular: Negative for chest pain and orthopnea.   Gastrointestinal: Negative for abdominal pain, diarrhea, nausea and vomiting.   Genitourinary: Positive for dysuria and frequency. Negative for flank pain, hematuria and urgency.   Musculoskeletal: Positive for back  "pain.   Neurological: Negative for dizziness and headaches.          Objective:     /86   Pulse 66   Temp 36.4 °C (97.6 °F)   Resp 16   Ht 1.626 m (5' 4\")   Wt 124.7 kg (275 lb)   LMP 09/24/2010   SpO2 97%   BMI 47.20 kg/m²      Physical Exam   Constitutional: She is oriented to person, place, and time. She appears well-developed and well-nourished. No distress.   Cardiovascular: Normal rate, regular rhythm and normal heart sounds.    No murmur heard.  Pulmonary/Chest: Effort normal and breath sounds normal. No respiratory distress.   Abdominal: Soft. Bowel sounds are normal. There is no tenderness. There is CVA tenderness.   Musculoskeletal: Normal range of motion.   Moves all 4 extremities normally   Neurological: She is alert and oriented to person, place, and time.   Skin: Skin is warm and dry.   Psychiatric: She has a normal mood and affect. Her behavior is normal. Thought content normal.   Vitals reviewed.              Assessment/Plan:     1. Acute left-sided low back pain without sciatica  POCT Urinalysis    URINE CULTURE(NEW)     Ibuprofen or aleve. Ice to area.  Will culture urine and call if positive.  Differential diagnosis, natural history, supportive care, and indications for immediate follow-up discussed at length.     "

## 2018-09-16 LAB
BACTERIA UR CULT: NORMAL
SIGNIFICANT IND 70042: NORMAL
SITE SITE: NORMAL
SOURCE SOURCE: NORMAL

## 2018-09-17 ENCOUNTER — HOSPITAL ENCOUNTER (OUTPATIENT)
Dept: LAB | Facility: MEDICAL CENTER | Age: 63
End: 2018-09-17
Attending: FAMILY MEDICINE
Payer: COMMERCIAL

## 2018-09-17 LAB
ALBUMIN SERPL BCP-MCNC: 4.2 G/DL (ref 3.2–4.9)
ALBUMIN/GLOB SERPL: 1.6 G/DL
ALP SERPL-CCNC: 92 U/L (ref 30–99)
ALT SERPL-CCNC: 44 U/L (ref 2–50)
ANION GAP SERPL CALC-SCNC: 6 MMOL/L (ref 0–11.9)
AST SERPL-CCNC: 29 U/L (ref 12–45)
BILIRUB SERPL-MCNC: 0.4 MG/DL (ref 0.1–1.5)
BUN SERPL-MCNC: 15 MG/DL (ref 8–22)
CALCIUM SERPL-MCNC: 8.8 MG/DL (ref 8.5–10.5)
CHLORIDE SERPL-SCNC: 96 MMOL/L (ref 96–112)
CHOLEST SERPL-MCNC: 184 MG/DL (ref 100–199)
CO2 SERPL-SCNC: 30 MMOL/L (ref 20–33)
CREAT SERPL-MCNC: 0.53 MG/DL (ref 0.5–1.4)
FASTING STATUS PATIENT QL REPORTED: NORMAL
GLOBULIN SER CALC-MCNC: 2.6 G/DL (ref 1.9–3.5)
GLUCOSE SERPL-MCNC: 111 MG/DL (ref 65–99)
HDLC SERPL-MCNC: 46 MG/DL
LDLC SERPL CALC-MCNC: 83 MG/DL
POTASSIUM SERPL-SCNC: 4.2 MMOL/L (ref 3.6–5.5)
PROT SERPL-MCNC: 6.8 G/DL (ref 6–8.2)
SODIUM SERPL-SCNC: 132 MMOL/L (ref 135–145)
TRIGL SERPL-MCNC: 277 MG/DL (ref 0–149)

## 2018-09-17 PROCEDURE — 36415 COLL VENOUS BLD VENIPUNCTURE: CPT

## 2018-09-17 PROCEDURE — 83036 HEMOGLOBIN GLYCOSYLATED A1C: CPT | Mod: GA

## 2018-09-17 PROCEDURE — 80053 COMPREHEN METABOLIC PANEL: CPT

## 2018-09-17 PROCEDURE — 80061 LIPID PANEL: CPT | Mod: GA

## 2018-09-18 LAB
EST. AVERAGE GLUCOSE BLD GHB EST-MCNC: 128 MG/DL
HBA1C MFR BLD: 6.1 % (ref 0–5.6)

## 2018-10-05 ENCOUNTER — OFFICE VISIT (OUTPATIENT)
Dept: URGENT CARE | Facility: PHYSICIAN GROUP | Age: 63
End: 2018-10-05
Payer: COMMERCIAL

## 2018-10-05 VITALS
HEIGHT: 64 IN | RESPIRATION RATE: 16 BRPM | TEMPERATURE: 97.2 F | DIASTOLIC BLOOD PRESSURE: 62 MMHG | WEIGHT: 271.4 LBS | SYSTOLIC BLOOD PRESSURE: 140 MMHG | HEART RATE: 68 BPM | BODY MASS INDEX: 46.33 KG/M2 | OXYGEN SATURATION: 96 %

## 2018-10-05 DIAGNOSIS — Z23 NEEDS FLU SHOT: ICD-10-CM
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
## 2018-10-05 DIAGNOSIS — S50.812A: ICD-10-CM

## 2018-10-05 PROCEDURE — 90471 IMMUNIZATION ADMIN: CPT | Performed by: NURSE PRACTITIONER

## 2018-10-05 PROCEDURE — 90686 IIV4 VACC NO PRSV 0.5 ML IM: CPT | Performed by: NURSE PRACTITIONER

## 2018-10-05 PROCEDURE — 90714 TD VACC NO PRESV 7 YRS+ IM: CPT | Performed by: NURSE PRACTITIONER

## 2018-10-05 PROCEDURE — 90472 IMMUNIZATION ADMIN EACH ADD: CPT | Performed by: NURSE PRACTITIONER

## 2018-10-05 PROCEDURE — 99214 OFFICE O/P EST MOD 30 MIN: CPT | Mod: 25 | Performed by: NURSE PRACTITIONER

## 2018-10-05 ASSESSMENT — ENCOUNTER SYMPTOMS
SENSORY CHANGE: 0
CHILLS: 0
FEVER: 0
MYALGIAS: 0
TINGLING: 0
HEADACHES: 0

## 2018-10-05 NOTE — PROGRESS NOTES
Subjective:      Herlinda Gill is a 62 y.o. female who presents with Laceration (recent scratch from dog, wants TDAP and flu shot)            HPI New problem. Ms. Gill is a 62 year old female with scatch to left forearm from her dog last night. She is requesting update on her tetanus and a flu shot as well. She denies pain, redness, swelling or discharge from scratch. She has no fever, chills or body aches. She has cleaned this with hydrogen peroxide.  Codeine; Depakote [divalproex sodium]; and Sulfa drugs  Current Outpatient Prescriptions on File Prior to Visit   Medication Sig Dispense Refill   • Fluticasone-Umeclidin-Vilant (TRELEGY ELLIPTA) 100-62.5-25 MCG/INH AEROSOL POWDER, BREATH ACTIVATED Inhale 1 Puff by mouth every day. 1 Each 11   • albuterol (PROAIR HFA) 108 (90 Base) MCG/ACT Aero Soln inhalation aerosol Inhale 2 Puffs by mouth every four hours as needed for Shortness of Breath. 1 Inhaler 11   • citalopram (CELEXA) 10 MG tablet TK 1 T PO  QAM  1   • gabapentin (NEURONTIN) 400 MG Cap TK 1 C PO TID  0   • HYDROcodone/acetaminophen (NORCO)  MG Tab   0   • levothyroxine (SYNTHROID) 75 MCG Tab TK 1 T PO QD  3   • levothyroxine (SYNTHROID) 25 MCG Tab TK 1 T PO QD  0   • losartan (COZAAR) 100 MG Tab TK 1 T PO QD  3   • omega-3 acid ethyl esters (LOVAZA) 1 GM capsule TK 2 CS PO BID  2   • omeprazole (PRILOSEC) 40 MG delayed-release capsule TK 1 C PO BID  0   • oseltamivir (TAMIFLU) 75 MG Cap Take 1 Cap by mouth 2 times a day. 10 Cap 0   • baclofen (LIORESAL) 20 MG tablet Take 20 mg by mouth 3 times a day.     • lubiprostone (AMITIZA) 24 MCG capsule Take 24 mcg by mouth 2 times a day, with meals.     • losartan (COZAAR) 50 MG Tab Take 50 mg by mouth every day.     • ondansetron (ZOFRAN ODT) 4 MG TABLET DISPERSIBLE Take 1 Tab by mouth every 8 hours as needed for Nausea/Vomiting. 20 Tab 0   • prednisoLONE acetate (PRED FORTE) 1 % Suspension Place 1 Drop in both eyes 4 times a day.     • trazodone (DESYREL)  100 MG Tab Take 100 mg by mouth every evening.     • atorvastatin (LIPITOR) 80 MG tablet Take 80 mg by mouth every evening.     • risperidone (RISPERDAL) 2 MG Tab Take 2 mg by mouth 2 times a day.     • carbamazepine (TEGRETOL) 200 MG Tab Take 200 mg by mouth 3 times a day.     • omeprazole (PRILOSEC) 20 MG Tablet Delayed Response delayed-release tablet Take  by mouth.     • gabapentin (NEURONTIN) 300 MG Cap Take 300 mg by mouth 3 times a day.     • ondansetron (ZOFRAN ODT) 4 MG TBDP Take 1 Tab by mouth every 8 hours as needed for Nausea/Vomiting. 10 Tab 0   • levofloxacin (LEVAQUIN) 500 MG tablet Take 1 Tab by mouth every day. 7 Tab 0   • fluticasone (FLONASE) 50 MCG/ACT nasal spray Spray 2 Sprays in nose every day. Each Nostril 16 g 0   • benztropine (COGENTIN) 2 MG TABS Take 2 mg by mouth 2 times a day.     • albuterol (PROVENTIL) 90 MCG/ACT AERS Inhale 2 Puffs by mouth every 6 hours as needed for Shortness of Breath. 1 Inhaler 3   • ziprasidone (GEODON) 60 MG CAPS Take 40 mg by mouth every day. 2 am 1 at noon 2 at dinner       No current facility-administered medications on file prior to visit.      Social History     Social History   • Marital status:      Spouse name: N/A   • Number of children: N/A   • Years of education: N/A     Occupational History   • Not on file.     Social History Main Topics   • Smoking status: Former Smoker     Packs/day: 2.00     Years: 40.00     Types: Cigarettes     Quit date: 12/2017   • Smokeless tobacco: Never Used   • Alcohol use No   • Drug use: No   • Sexual activity: Yes     Other Topics Concern   • Not on file     Social History Narrative   • No narrative on file     family history includes Cancer in her father; GI in her sister; Heart Disease in her mother.      Review of Systems   Constitutional: Negative for chills, fever and malaise/fatigue.   Musculoskeletal: Negative for myalgias.   Skin:        Scratch to left forearm   Neurological: Negative for tingling,  "sensory change and headaches.          Objective:     /62 (BP Location: Right arm, Patient Position: Sitting, BP Cuff Size: Adult)   Pulse 68   Temp 36.2 °C (97.2 °F)   Resp 16   Ht 1.626 m (5' 4\")   Wt 123.1 kg (271 lb 6.4 oz)   LMP 09/24/2010   SpO2 96%   BMI 46.59 kg/m²      Physical Exam   Constitutional: She is oriented to person, place, and time. She appears well-developed and well-nourished. No distress.   Cardiovascular: Normal rate, regular rhythm and normal heart sounds.    No murmur heard.  Pulmonary/Chest: Effort normal and breath sounds normal.   Musculoskeletal: Normal range of motion.   Neurological: She is alert and oriented to person, place, and time.   Skin: Skin is warm and dry.   approx 4 cm scratch with scab to left forearm. No surrounding erythema or discharge from area.               Assessment/Plan:     1. Scratch of forearm, left, initial encounter  TD Preservative Free =>6yo IM    Flu Quad Inj >3 Year Pre-Filled PF   2. Dog scratch     3. Needs flu shot       Differential diagnosis, natural history, supportive care, and indications for immediate follow-up discussed at length.     "

## 2018-12-31 ENCOUNTER — OFFICE VISIT (OUTPATIENT)
Dept: PULMONOLOGY | Facility: HOSPICE | Age: 63
End: 2018-12-31
Payer: COMMERCIAL

## 2018-12-31 VITALS
DIASTOLIC BLOOD PRESSURE: 72 MMHG | TEMPERATURE: 97.8 F | HEIGHT: 64 IN | SYSTOLIC BLOOD PRESSURE: 134 MMHG | WEIGHT: 269 LBS | BODY MASS INDEX: 45.93 KG/M2 | OXYGEN SATURATION: 97 % | RESPIRATION RATE: 16 BRPM | HEART RATE: 67 BPM

## 2018-12-31 DIAGNOSIS — R91.8 PULMONARY NODULES: ICD-10-CM

## 2018-12-31 DIAGNOSIS — G47.33 OSA (OBSTRUCTIVE SLEEP APNEA): ICD-10-CM

## 2018-12-31 DIAGNOSIS — J44.9 CHRONIC OBSTRUCTIVE PULMONARY DISEASE, UNSPECIFIED COPD TYPE (HCC): ICD-10-CM

## 2018-12-31 DIAGNOSIS — R09.02 HYPOXIA: ICD-10-CM

## 2018-12-31 PROCEDURE — 99213 OFFICE O/P EST LOW 20 MIN: CPT | Performed by: PHYSICIAN ASSISTANT

## 2018-12-31 NOTE — PATIENT INSTRUCTIONS
1-follow up in 5 months   2-follow up CT in 9 months, follow up in 10 months to review CT results  3-continue Trelegy as it is working well  4-will work on DME issues for oxygen and CPAP

## 2018-12-31 NOTE — PROGRESS NOTES
CC    HPI:  Herlinda Gill is a 63 y.o. year old female here today for follow-up on COPD, MARTITA and hypoxia.  Patient reports she has been advised by her DME to obtain new orders for CPAP, concentrator, portable O2 tanks.  Patient was last seen in this office by Dr. Bautista in August 2018.  Reviewed in office vitals including blood pressure of 134/72, pulse of 67, O2 saturation of 97% on 4 L pulsed, BMI of 46.17.  Patient current home regimen consist of trelegy as well as her oxygen 24/7 and Flonase when needed.  In reviewing patient records it appears she is on an AutoPap of 8-14.  Patient did not have a compliance chip with her however will query [facility], patient reports consistent daily use.  Did review outside records and patient had a chest x-ray 6/19/2018 which was clear, had a PFT 7/17/2018 which demonstrated an FVC of 2.97 L or 77% predicted, and FEV1 of 2.16 L or 74% predicted, a DLCO of 83% predicted, a residual volume of 1.88 L or 124% predicted, a total lung capacity of 4.86 L or 95% predicted.  Patient did have a positive bronchodilator response in FEV1.  Patient did have a CT performed 12/4/2017 which demonstrated multiple pulmonary nodules stable calcified in the lingula, scarring/atelectasis in the left lung base, no new nodules, unchanged scoliosis hardware present lower cervical spine.    ROS:   Constitutional: Patient reports she is always fatigued, no fever, chills, night sweats, unintentional weight loss  Skin: No rashes, hair or nail changes, lumps, sores  Eyes: Does wear glasses, denies sudden onset blurring or other vision changes  ENT: History of seasonal allergies and sinus infections, denies dentures, persistent hoarseness or sore throat, no earaches, no nasal congestion or runny nose  GI: No heartburn or reflux, no difficulty swallowing  Respiratory: No cough, wheezing, does experience occasional shortness of breath at rest as well as shortness of breath with activity, past history of  pneumonia, bronchitis, no history of TB or occupational exposure  CV: Past history of A. fib, no history of heart murmur, chest pain or pressure, lower extremity edema, orthopnea  Past Medical History:   Diagnosis Date   • Allergic rhinitis    • Arthritis    • Atrial fibrillation (HCC)     intermittent   • Back pain    • Bipolar affective disorder (HCC)    • Breath shortness    • Bronchitis    • Chest tightness    • Chickenpox    • Constipation    • COPD (chronic obstructive pulmonary disease) (HCC)    • Daytime sleepiness    • Depression    • Hyperlipidemia    • Indigestion    • Lumbar disc disease    • Menopause    • Morning headache    • Nasal drainage    • Obesity    • Pneumonia    • Pulmonary emphysema (HCC)    • Shortness of breath    • Sinusitis    • Sleep apnea     CPAP and O2 at night   • Wears glasses    • Wheezing        Past Surgical History:   Procedure Laterality Date   • CERVICAL DISK AND FUSION ANTERIOR  12/29/2011    Performed by MICKEY BOLIVAR at SURGERY [facility]   • HYSTERECTOMY LAPAROSCOPY     • HYSTERECTOMY, TOTAL ABDOMINAL     • LAMINOTOMY     • PB REMV 2ND CATARACT,CORN-SCLER SECTN         Family History   Problem Relation Age of Onset   • Heart Disease Mother    • Cancer Father         lung   • GI Sister         cirrhosis       Social History     Social History   • Marital status:      Spouse name: N/A   • Number of children: N/A   • Years of education: N/A     Occupational History   • Not on file.     Social History Main Topics   • Smoking status: Former Smoker     Packs/day: 2.00     Years: 40.00     Types: Cigarettes     Quit date: 12/2017   • Smokeless tobacco: Never Used   • Alcohol use No   • Drug use: No   • Sexual activity: Yes     Other Topics Concern   • Not on file     Social History Narrative   • No narrative on file       Allergies as of 12/31/2018 - Reviewed 12/31/2018   Allergen Reaction Noted   • Codeine Vomiting 08/10/2016   • Depakote [divalproex sodium]  "Myalgia 01/27/2018   • Sulfa drugs Rash 09/13/2010        @Vital signs for this encounter:  Vitals:    12/31/18 0920 12/31/18 0923   Height: 1.626 m (5' 4\")    Weight: 122 kg (269 lb)    Weight % change since last entry.: 0 %    BP: 134/72    Pulse: 67    BMI (Calculated): 46.17    Resp: 16    Temp: 36.6 °C (97.8 °F)    TempSrc: Oral    O2 sat % on O2:  97 %       Current medications as of today   Current Outpatient Prescriptions   Medication Sig Dispense Refill   • Fluticasone-Umeclidin-Vilant (TRELEGY ELLIPTA) 100-62.5-25 MCG/INH AEROSOL POWDER, BREATH ACTIVATED Inhale 1 Puff by mouth every day. 1 Each 11   • albuterol (PROAIR HFA) 108 (90 Base) MCG/ACT Aero Soln inhalation aerosol Inhale 2 Puffs by mouth every four hours as needed for Shortness of Breath. 1 Inhaler 11   • citalopram (CELEXA) 10 MG tablet TK 1 T PO  QAM  1   • gabapentin (NEURONTIN) 400 MG Cap TK 1 C PO TID  0   • HYDROcodone/acetaminophen (NORCO)  MG Tab   0   • levothyroxine (SYNTHROID) 75 MCG Tab TK 1 T PO QD  3   • levothyroxine (SYNTHROID) 25 MCG Tab TK 1 T PO QD  0   • losartan (COZAAR) 100 MG Tab TK 1 T PO QD  3   • omega-3 acid ethyl esters (LOVAZA) 1 GM capsule TK 2 CS PO BID  2   • omeprazole (PRILOSEC) 40 MG delayed-release capsule TK 1 C PO BID  0   • oseltamivir (TAMIFLU) 75 MG Cap Take 1 Cap by mouth 2 times a day. 10 Cap 0   • baclofen (LIORESAL) 20 MG tablet Take 20 mg by mouth 3 times a day.     • lubiprostone (AMITIZA) 24 MCG capsule Take 24 mcg by mouth 2 times a day, with meals.     • losartan (COZAAR) 50 MG Tab Take 50 mg by mouth every day.     • ondansetron (ZOFRAN ODT) 4 MG TABLET DISPERSIBLE Take 1 Tab by mouth every 8 hours as needed for Nausea/Vomiting. 20 Tab 0   • prednisoLONE acetate (PRED FORTE) 1 % Suspension Place 1 Drop in both eyes 4 times a day.     • trazodone (DESYREL) 100 MG Tab Take 100 mg by mouth every evening.     • atorvastatin (LIPITOR) 80 MG tablet Take 80 mg by mouth every evening.     • " risperidone (RISPERDAL) 2 MG Tab Take 2 mg by mouth 2 times a day.     • carbamazepine (TEGRETOL) 200 MG Tab Take 200 mg by mouth 3 times a day.     • omeprazole (PRILOSEC) 20 MG Tablet Delayed Response delayed-release tablet Take  by mouth.     • gabapentin (NEURONTIN) 300 MG Cap Take 300 mg by mouth 3 times a day.     • ondansetron (ZOFRAN ODT) 4 MG TBDP Take 1 Tab by mouth every 8 hours as needed for Nausea/Vomiting. 10 Tab 0   • levofloxacin (LEVAQUIN) 500 MG tablet Take 1 Tab by mouth every day. 7 Tab 0   • fluticasone (FLONASE) 50 MCG/ACT nasal spray Spray 2 Sprays in nose every day. Each Nostril 16 g 0   • benztropine (COGENTIN) 2 MG TABS Take 2 mg by mouth 2 times a day.     • albuterol (PROVENTIL) 90 MCG/ACT AERS Inhale 2 Puffs by mouth every 6 hours as needed for Shortness of Breath. 1 Inhaler 3   • ziprasidone (GEODON) 60 MG CAPS Take 40 mg by mouth every day. 2 am 1 at noon 2 at dinner       No current facility-administered medications for this visit.          Physical Exam:   Gen:           Alert and oriented, No apparent distress. Mood and affect appropriate, normal interaction   Eyes:          sclere white, conjunctive moist.  Hearing:     Grossly intact.  Dentition:    Good dentition.  Oropharynx:   Tongue normal, posterior pharynx without erythema or exudate.  Mallampati Classification:III  Neck:        Supple, trachea midline, no masses.  Respiratory Effort: No intercostal retractions or use of accessory muscles.   Lung Auscultation:      Decreased throughout, clear to auscultation; no rales, rhonchi or wheezing.  CV:            Regular rate and rhythm. No murmurs, rubs or gallops appreciated.  No edema  Digits, Nails, Ext: No clubbing, cyanosis, petechiae, or nodes.   Skin:        No rashes, lesions or ulcers noted on back or exposed skin surfaces.                     Assessment:  1. Chronic obstructive pulmonary disease, unspecified COPD type (HCC)   continue current home regimen of Trelegy,  albuterol, omeprazole and Flonase   2. BMI 45.0-49.9, adult (HCC)   increase activity as tolerated, monitor portion sizes and menu choices   3. Hypoxia  Multiple Oximetry    DME O2 New Set Up   4. Pulmonary nodules  CT-CHEST (THORAX) W/O   5. MARTITA (obstructive sleep apnea)  DME CPAP       Immunizations:    Flu: 10/5/2018   Pneumovax 23: 2/3/17-second dose  Prevnar 13: 2/26/2016    Plan:    1-follow up in 5 months   2-follow up CT in 9 months, follow up in 10 months to review CT results  3-continue Trelegy as it is working well  4-will work on DME issues for oxygen and CPAP      This dictation was created using voice recognition software. The accuracy of the dictation is limited to the abilities of the software. I expect there may be some errors of grammar and possibly content.

## 2019-05-14 ENCOUNTER — TELEPHONE (OUTPATIENT)
Dept: SCHEDULING | Facility: IMAGING CENTER | Age: 64
End: 2019-05-14

## 2019-05-29 ENCOUNTER — OFFICE VISIT (OUTPATIENT)
Dept: URGENT CARE | Facility: PHYSICIAN GROUP | Age: 64
End: 2019-05-29
Payer: COMMERCIAL

## 2019-05-29 VITALS
TEMPERATURE: 98.6 F | HEART RATE: 86 BPM | SYSTOLIC BLOOD PRESSURE: 146 MMHG | BODY MASS INDEX: 46.52 KG/M2 | DIASTOLIC BLOOD PRESSURE: 72 MMHG | OXYGEN SATURATION: 97 % | RESPIRATION RATE: 16 BRPM | WEIGHT: 271 LBS

## 2019-05-29 DIAGNOSIS — B96.89 ACUTE BACTERIAL SINUSITIS: ICD-10-CM

## 2019-05-29 DIAGNOSIS — J01.90 ACUTE BACTERIAL SINUSITIS: ICD-10-CM

## 2019-05-29 DIAGNOSIS — R11.2 NAUSEA AND VOMITING, INTRACTABILITY OF VOMITING NOT SPECIFIED, UNSPECIFIED VOMITING TYPE: ICD-10-CM

## 2019-05-29 PROCEDURE — 99214 OFFICE O/P EST MOD 30 MIN: CPT | Performed by: FAMILY MEDICINE

## 2019-05-29 RX ORDER — ALBUTEROL SULFATE 2.5 MG/3ML
SOLUTION RESPIRATORY (INHALATION)
Refills: 3 | COMMUNITY
Start: 2019-03-13 | End: 2021-07-01

## 2019-05-29 RX ORDER — OMEGA-3-ACID ETHYL ESTERS 1 G/1
CAPSULE, LIQUID FILLED ORAL
COMMUNITY
Start: 2019-04-05 | End: 2021-07-01

## 2019-05-29 RX ORDER — ONDANSETRON 4 MG/1
TABLET, ORALLY DISINTEGRATING ORAL
Qty: 21 TAB | Refills: 0 | Status: SHIPPED | OUTPATIENT
Start: 2019-05-29 | End: 2021-07-01

## 2019-05-29 RX ORDER — RISPERIDONE 3 MG/1
TABLET ORAL
Refills: 3 | COMMUNITY
Start: 2019-03-13 | End: 2019-05-29

## 2019-05-29 RX ORDER — SPIRONOLACTONE 25 MG/1
TABLET ORAL
COMMUNITY
Start: 2019-03-16 | End: 2021-07-01

## 2019-05-29 RX ORDER — CEFDINIR 300 MG/1
CAPSULE ORAL
Qty: 20 CAP | Refills: 0 | Status: SHIPPED | OUTPATIENT
Start: 2019-05-29 | End: 2021-07-01

## 2019-05-29 RX ORDER — METHOCARBAMOL 750 MG/1
TABLET, FILM COATED ORAL
Refills: 2 | COMMUNITY
Start: 2019-03-08 | End: 2021-07-01

## 2019-05-29 RX ORDER — TRAZODONE HYDROCHLORIDE 150 MG/1
TABLET ORAL
Refills: 0 | COMMUNITY
Start: 2019-05-18 | End: 2019-05-29

## 2019-05-29 NOTE — PROGRESS NOTES
Chief Complaint:    Chief Complaint   Patient presents with   • Sinus Problem     thinks sinus infection, nausea, headaches, x5 days        History of Present Illness:    This is a new problem. For 5 days, she has headache, nasal symptoms with purulent mucus from nose, and post-nasal drainage which is causing her to vomit. Overall at least moderate severity and not getting better. Symptoms feel like typical sinus infection. Cefdinir and Zofran have worked/tolerated for similar previous symptoms.      Review of Systems:    Constitutional: Negative for fever, chills, and diaphoresis.   Eyes: Negative for change in vision, photophobia, pain, redness, and discharge.  ENT: See HPI.   Respiratory: On NC Oxygen for COPD. No new symptoms.   Cardiovascular: Negative for chest pain.   Gastrointestinal: See HPI.   Genitourinary: Negative for dysuria, urinary urgency, urinary frequency, hematuria, and flank pain.   Musculoskeletal: No new symptoms.  Skin: Negative for rash and itching.   Neurological: See HPI.   Endo: Negative for polydipsia.   Heme: Does not bruise/bleed easily.   Psychiatric/Behavioral: No new symptoms.        Past Medical History:    Past Medical History:   Diagnosis Date   • Allergic rhinitis    • Arthritis    • Atrial fibrillation (HCC)     intermittent   • Back pain    • Bipolar affective disorder ([facility])    • Breath shortness    • Bronchitis    • Chest tightness    • Chickenpox    • Constipation    • COPD (chronic obstructive pulmonary disease) ([facility])    • Daytime sleepiness    • Depression    • Hyperlipidemia    • Indigestion    • Lumbar disc disease    • Menopause    • Morning headache    • Nasal drainage    • Obesity    • Pneumonia    • Pulmonary emphysema (HCC)    • Shortness of breath    • Sinusitis    • Sleep apnea     CPAP and O2 at night   • Wears glasses    • Wheezing      Past Surgical History:    Past Surgical History:   Procedure Laterality Date   • CERVICAL DISK AND FUSION ANTERIOR  12/29/2011     Performed by MICKEY BOLIVAR at SURGERY [facility]S   • HYSTERECTOMY LAPAROSCOPY     • HYSTERECTOMY, TOTAL ABDOMINAL     • LAMINOTOMY     • PB REMV 2ND CATARACT,CORN-SCLER SECTN       Social History:    Social History     Social History   • Marital status:      Spouse name: N/A   • Number of children: N/A   • Years of education: N/A     Occupational History   • Not on file.     Social History Main Topics   • Smoking status: Former Smoker     Packs/day: 2.00     Years: 40.00     Types: Cigarettes     Quit date: 12/2017   • Smokeless tobacco: Never Used      Comment: Continued abstinence   • Alcohol use No   • Drug use: No   • Sexual activity: Yes     Other Topics Concern   • Not on file     Social History Narrative   • No narrative on file     Family History:    Family History   Problem Relation Age of Onset   • Heart Disease Mother    • Cancer Father         lung   • GI Sister         cirrhosis     Medications:    Current Outpatient Prescriptions on File Prior to Visit   Medication Sig Dispense Refill   • gabapentin (NEURONTIN) 400 MG Cap TK 1 C PO TID  0   • omeprazole (PRILOSEC) 40 MG delayed-release capsule TK 1 C PO BID  0   • risperidone (RISPERDAL) 2 MG Tab Take 2 mg by mouth 2 times a day.     • carbamazepine (TEGRETOL) 200 MG Tab Take 200 mg by mouth 3 times a day.     • Fluticasone-Umeclidin-Vilant (TRELEGY ELLIPTA) 100-62.5-25 MCG/INH AEROSOL POWDER, BREATH ACTIVATED Inhale 1 Puff by mouth every day. 1 Each 11   • albuterol (PROAIR HFA) 108 (90 Base) MCG/ACT Aero Soln inhalation aerosol Inhale 2 Puffs by mouth every four hours as needed for Shortness of Breath. 1 Inhaler 11   • citalopram (CELEXA) 10 MG tablet TK 1 T PO  QAM  1   • HYDROcodone/acetaminophen (NORCO)  MG Tab   0   • levothyroxine (SYNTHROID) 75 MCG Tab TK 1 T PO QD  3   • levothyroxine (SYNTHROID) 25 MCG Tab TK 1 T PO QD  0   • losartan (COZAAR) 100 MG Tab TK 1 T PO QD  3   • baclofen (LIORESAL) 20 MG tablet Take 20 mg by  mouth 3 times a day.     • lubiprostone (AMITIZA) 24 MCG capsule Take 24 mcg by mouth 2 times a day, with meals.     • prednisoLONE acetate (PRED FORTE) 1 % Suspension Place 1 Drop in both eyes 4 times a day.     • trazodone (DESYREL) 100 MG Tab Take 100 mg by mouth every evening.     • atorvastatin (LIPITOR) 80 MG tablet Take 80 mg by mouth every evening.     • fluticasone (FLONASE) 50 MCG/ACT nasal spray Spray 2 Sprays in nose every day. Each Nostril 16 g 0   • benztropine (COGENTIN) 2 MG TABS Take 2 mg by mouth 2 times a day.     • ziprasidone (GEODON) 60 MG CAPS Take 40 mg by mouth every day. 2 am 1 at noon 2 at dinner       No current facility-administered medications on file prior to visit.      Allergies:    Allergies   Allergen Reactions   • Codeine Vomiting     Vomiting and Rash   • Depakote [Divalproex Sodium] Myalgia   • Sulfa Drugs Rash     Rash and vomiting       Vitals:    Vitals:    05/29/19 1210   BP: 146/72   Pulse: 86   Resp: 16   Temp: 37 °C (98.6 °F)   SpO2: 97%   Weight: 122.9 kg (271 lb)       Physical Exam:    Constitutional: Vital signs reviewed. Appears well-developed and well-nourished. No acute distress. On NC Oxygen.  Eyes: Sclera white, conjunctivae clear. PERRLA.  ENT: TTP bilateral maxillary sinus regions. External ears normal. External auditory canals normal without discharge. TMs translucent and non-bulging. Hearing normal. Nasal mucosa erythematous. Lips are normal. Oral mucosa pink and moist. Posterior pharynx: WNL.  Neck: Neck supple.   Cardiovascular: Regular rate and rhythm. No murmur.  Pulmonary/Chest: Respirations non-labored. Clear to auscultation bilaterally.  Abdomen: Bowel sounds are normal active. Soft, non-distended, and non-tender to palpation.  Lymph: Cervical nodes without tenderness or enlargement.  Musculoskeletal: Normal gait. No muscular atrophy or weakness.  Neurological: Alert and oriented to person, place, and time. CN 2-12 intact. Muscle tone normal.  Coordination normal.   Skin: No rashes or lesions. Warm, dry, normal turgor.  Psychiatric: Normal mood and affect. Behavior is normal. Judgment and thought content normal.       Assessment / Plan:    1. Acute bacterial sinusitis  - cefdinir (OMNICEF) 300 MG Cap; 1 CAP TWICE A DAY X 10 DAYS.  Dispense: 20 Cap; Refill: 0    2. Nausea and vomiting, intractability of vomiting not specified, unspecified vomiting type  - ondansetron (ZOFRAN ODT) 4 MG TABLET DISPERSIBLE; 1 TAB, ALLOW TO DISINTEGRATE IN MOUTH, EVERY 8 HOURS ONLY IF NEEDED FOR NAUSEA OR VOMITING.  Dispense: 21 Tab; Refill: 0      Discussed with her DDX, management options, and risks, benefits, and alternatives to treatment plan agreed upon.    Agreeable to medications prescribed.    Discussed expected course of duration, time for improvement, and to seek follow-up in Emergency Room, urgent care, or with PCP if getting worse at any time or not improving within expected time frame.

## 2019-05-31 ENCOUNTER — APPOINTMENT (OUTPATIENT)
Dept: PULMONOLOGY | Facility: HOSPICE | Age: 64
End: 2019-05-31
Payer: COMMERCIAL

## 2021-03-01 ENCOUNTER — HOSPITAL ENCOUNTER (OUTPATIENT)
Dept: LAB | Facility: MEDICAL CENTER | Age: 66
End: 2021-03-01
Attending: FAMILY MEDICINE
Payer: MEDICARE

## 2021-03-01 LAB
ALBUMIN SERPL BCP-MCNC: 3.9 G/DL (ref 3.2–4.9)
ALBUMIN/GLOB SERPL: 1.3 G/DL
ALP SERPL-CCNC: 121 U/L (ref 30–99)
ALT SERPL-CCNC: 46 U/L (ref 2–50)
ANION GAP SERPL CALC-SCNC: 8 MMOL/L (ref 7–16)
AST SERPL-CCNC: 34 U/L (ref 12–45)
BILIRUB SERPL-MCNC: <0.2 MG/DL (ref 0.1–1.5)
BUN SERPL-MCNC: 15 MG/DL (ref 8–22)
CALCIUM SERPL-MCNC: 9.5 MG/DL (ref 8.5–10.5)
CHLORIDE SERPL-SCNC: 94 MMOL/L (ref 96–112)
CO2 SERPL-SCNC: 30 MMOL/L (ref 20–33)
CREAT SERPL-MCNC: 0.52 MG/DL (ref 0.5–1.4)
CRP SERPL HS-MCNC: 0.23 MG/DL (ref 0–0.75)
GLOBULIN SER CALC-MCNC: 2.9 G/DL (ref 1.9–3.5)
GLUCOSE SERPL-MCNC: 96 MG/DL (ref 65–99)
POTASSIUM SERPL-SCNC: 4.1 MMOL/L (ref 3.6–5.5)
PROT SERPL-MCNC: 6.8 G/DL (ref 6–8.2)
SODIUM SERPL-SCNC: 132 MMOL/L (ref 135–145)

## 2021-03-01 PROCEDURE — 36415 COLL VENOUS BLD VENIPUNCTURE: CPT

## 2021-03-01 PROCEDURE — 86140 C-REACTIVE PROTEIN: CPT

## 2021-03-01 PROCEDURE — 85652 RBC SED RATE AUTOMATED: CPT

## 2021-03-01 PROCEDURE — 80053 COMPREHEN METABOLIC PANEL: CPT

## 2021-03-02 LAB — ERYTHROCYTE [SEDIMENTATION RATE] IN BLOOD BY WESTERGREN METHOD: <1 MM/HOUR (ref 0–30)

## 2021-06-21 ENCOUNTER — TELEPHONE (OUTPATIENT)
Dept: SCHEDULING | Facility: IMAGING CENTER | Age: 66
End: 2021-06-21

## 2021-07-01 ENCOUNTER — OFFICE VISIT (OUTPATIENT)
Dept: MEDICAL GROUP | Facility: CLINIC | Age: 66
End: 2021-07-01
Payer: MEDICARE

## 2021-07-01 VITALS
BODY MASS INDEX: 44.9 KG/M2 | DIASTOLIC BLOOD PRESSURE: 62 MMHG | OXYGEN SATURATION: 97 % | SYSTOLIC BLOOD PRESSURE: 128 MMHG | TEMPERATURE: 97.5 F | WEIGHT: 263 LBS | RESPIRATION RATE: 14 BRPM | HEIGHT: 64 IN | HEART RATE: 67 BPM

## 2021-07-01 DIAGNOSIS — E03.9 ACQUIRED HYPOTHYROIDISM: ICD-10-CM

## 2021-07-01 DIAGNOSIS — Z99.81 CHRONIC RESPIRATORY FAILURE WITH HYPOXIA, ON HOME OXYGEN THERAPY (HCC): ICD-10-CM

## 2021-07-01 DIAGNOSIS — R13.19 OTHER DYSPHAGIA: ICD-10-CM

## 2021-07-01 DIAGNOSIS — J96.11 CHRONIC RESPIRATORY FAILURE WITH HYPOXIA, ON HOME OXYGEN THERAPY (HCC): ICD-10-CM

## 2021-07-01 DIAGNOSIS — Z98.1 HISTORY OF FUSION OF CERVICAL SPINE: ICD-10-CM

## 2021-07-01 DIAGNOSIS — K21.9 GERD WITHOUT ESOPHAGITIS: ICD-10-CM

## 2021-07-01 DIAGNOSIS — E87.1 CHRONIC HYPONATREMIA: ICD-10-CM

## 2021-07-01 DIAGNOSIS — E78.2 MIXED HYPERLIPIDEMIA: ICD-10-CM

## 2021-07-01 DIAGNOSIS — R73.03 PREDIABETES: ICD-10-CM

## 2021-07-01 DIAGNOSIS — Z96.641 HISTORY OF RIGHT HIP REPLACEMENT: ICD-10-CM

## 2021-07-01 PROBLEM — J41.0 SIMPLE CHRONIC BRONCHITIS (HCC): Status: ACTIVE | Noted: 2021-07-01

## 2021-07-01 PROBLEM — Z79.01 LONG TERM (CURRENT) USE OF ANTICOAGULANTS: Status: ACTIVE | Noted: 2021-02-23

## 2021-07-01 PROBLEM — R05.9 COUGH: Status: ACTIVE | Noted: 2021-07-01

## 2021-07-01 PROBLEM — M16.11 PRIMARY OSTEOARTHRITIS OF RIGHT HIP: Status: ACTIVE | Noted: 2021-04-26

## 2021-07-01 PROBLEM — M19.90 ARTHRITIS: Status: ACTIVE | Noted: 2021-02-23

## 2021-07-01 PROBLEM — R91.1 SOLITARY PULMONARY NODULE: Status: ACTIVE | Noted: 2021-02-23

## 2021-07-01 PROBLEM — G44.219 EPISODIC TENSION-TYPE HEADACHE, NOT INTRACTABLE: Status: ACTIVE | Noted: 2021-02-23

## 2021-07-01 PROBLEM — M16.11 PRIMARY OSTEOARTHRITIS OF RIGHT HIP: Status: RESOLVED | Noted: 2021-04-26 | Resolved: 2021-07-01

## 2021-07-01 PROBLEM — R06.09 DYSPNEA ON EXERTION: Status: RESOLVED | Noted: 2020-10-25 | Resolved: 2021-07-01

## 2021-07-01 PROBLEM — R01.1 MURMUR: Status: ACTIVE | Noted: 2021-02-23

## 2021-07-01 PROBLEM — R06.09 DYSPNEA ON EXERTION: Status: ACTIVE | Noted: 2020-10-25

## 2021-07-01 PROBLEM — R09.02 HYPOXIA: Status: RESOLVED | Noted: 2018-12-31 | Resolved: 2021-07-01

## 2021-07-01 PROBLEM — R13.10 DYSPHAGIA: Status: ACTIVE | Noted: 2021-02-23

## 2021-07-01 PROBLEM — G47.33 OBSTRUCTIVE SLEEP APNEA OF ADULT: Status: ACTIVE | Noted: 2020-10-25

## 2021-07-01 PROBLEM — R05.9 COUGH: Status: RESOLVED | Noted: 2021-07-01 | Resolved: 2021-07-01

## 2021-07-01 PROBLEM — F31.31 BIPOLAR AFFECTIVE DISORDER, CURRENTLY DEPRESSED, MILD (HCC): Status: ACTIVE | Noted: 2021-02-23

## 2021-07-01 PROBLEM — M51.35 DDD (DEGENERATIVE DISC DISEASE), THORACOLUMBAR: Status: ACTIVE | Noted: 2021-02-23

## 2021-07-01 PROBLEM — K76.0 HEPATIC STEATOSIS: Status: ACTIVE | Noted: 2021-02-23

## 2021-07-01 PROBLEM — I48.0 PAROXYSMAL ATRIAL FIBRILLATION (HCC): Status: ACTIVE | Noted: 2020-10-25

## 2021-07-01 PROCEDURE — 99204 OFFICE O/P NEW MOD 45 MIN: CPT | Performed by: PHYSICIAN ASSISTANT

## 2021-07-01 RX ORDER — RIVAROXABAN 20 MG/1
20 TABLET, FILM COATED ORAL DAILY
COMMUNITY
Start: 2021-04-20 | End: 2021-07-15

## 2021-07-01 RX ORDER — EZETIMIBE 10 MG/1
10 TABLET ORAL DAILY
COMMUNITY
End: 2023-09-15

## 2021-07-01 RX ORDER — LEVOTHYROXINE SODIUM 0.07 MG/1
TABLET ORAL
COMMUNITY
End: 2021-07-01

## 2021-07-01 RX ORDER — ALBUTEROL SULFATE 2.5 MG/3ML
3 SOLUTION RESPIRATORY (INHALATION) EVERY 6 HOURS PRN
COMMUNITY
End: 2021-08-24

## 2021-07-01 RX ORDER — TRAZODONE HYDROCHLORIDE 300 MG/1
300 TABLET ORAL
COMMUNITY
End: 2023-09-15

## 2021-07-01 RX ORDER — PANTOPRAZOLE SODIUM 20 MG/1
20 TABLET, DELAYED RELEASE ORAL DAILY
Qty: 90 TABLET | Refills: 0 | Status: SHIPPED | OUTPATIENT
Start: 2021-07-01 | End: 2021-10-12

## 2021-07-01 RX ORDER — HYDROCHLOROTHIAZIDE 25 MG/1
25 TABLET ORAL DAILY
COMMUNITY
End: 2021-08-30

## 2021-07-01 RX ORDER — METOPROLOL TARTRATE 100 MG/1
100 TABLET ORAL 2 TIMES DAILY
COMMUNITY
End: 2021-07-01

## 2021-07-01 RX ORDER — METOPROLOL SUCCINATE 100 MG/1
100 TABLET, EXTENDED RELEASE ORAL DAILY
COMMUNITY
End: 2023-09-15

## 2021-07-01 RX ORDER — DULOXETIN HYDROCHLORIDE 20 MG/1
20 CAPSULE, DELAYED RELEASE ORAL 2 TIMES DAILY
COMMUNITY
End: 2023-09-15

## 2021-07-01 RX ORDER — LUBIPROSTONE 24 UG/1
CAPSULE ORAL
COMMUNITY
End: 2021-07-01

## 2021-07-01 NOTE — LETTER
[facility]  Merissa Mendieta P.A.-C.  [address] [address] [address]  [facility] [address]  Fax: [phone]   Authorization for Release/Disclosure of   Protected Health Information   Name: JOSE ROBERTO ROSE : 1955 SSN: [id number]   Address: [address] [address] Phone:    [phone] (home)    I authorize the entity listed below to release/disclose the PHI below to:   [facility]/Merissa Mendieta P.A.-C. and Merissa Mendieta P.A.-C.   Provider or Entity Name:  Jay Case [facility]   Address   [facility]   Phone:  [phone]    Fax:     Reason for request: continuity of care   Information to be released:    [  ] LAST COLONOSCOPY,  including any PATH REPORT and follow-up  [  ] LAST FIT/COLOGUARD RESULT [  ] LAST DEXA  [  ] LAST MAMMOGRAM  [  ] LAST PAP  [  ] LAST LABS [  ] RETINA EXAM REPORT  [  ] IMMUNIZATION RECORDS  [ X ] Release all info      [  ] Check here and initial the line next to each item to release ALL health information INCLUDING  _____ Care and treatment for drug and / or alcohol abuse  _____ HIV testing, infection status, or AIDS  _____ Genetic Testing    DATES OF SERVICE OR TIME PERIOD TO BE DISCLOSED: _____________  I understand and acknowledge that:  * This Authorization may be revoked at any time by you in writing, except if your health information has already been used or disclosed.  * Your health information that will be used or disclosed as a result of you signing this authorization could be re-disclosed by the recipient. If this occurs, your re-disclosed health information may no longer be protected by State or Federal laws.  * You may refuse to sign this Authorization. Your refusal will not affect your ability to obtain treatment.  * This Authorization becomes effective upon signing and will  on (date) __________.      If no date is indicated, this Authorization will  one (1) year from the signature date.       Name: Herlinda Gill    Signature:   Date:     7/1/2021       PLEASE FAX REQUESTED RECORDS BACK TO: [phone]

## 2021-07-01 NOTE — PROGRESS NOTES
Chief Complaint   Patient presents with   • Establish Care   • Hypertension   • Numbness     (L) hand/fingers.  x6 months with numbness getting worse.        HPI:  Herlinda is a 65 y.o. female new patient presenting with the following:    Acquired hypothyroidism  The patient's chronic condition is well controlled on the current therapy with no new symptoms or worsening. Labs done 04/24/21 TSH 2.70, Free T4 0.69.  States that she stopped taking her thyroid medication because she did not believe she needed it.  We requested records from her previous PCP.  She will follow-up with us in 2 weeks.  We will recheck labs in 3 months.    Chronic hyponatremia  Chronic condition for this patient.  Most recent CMP shows sodium at 131.  Patient has been encouraged to add a little bit of sodium to her diet.  She has been instructed not to go overboard.  Patient sodium levels have been mildly decreased for the last 2 years.  We will continue to monitor.    Mixed hyperlipidemia  This is a chronic health problem that is uncontrolled with current medications and lifestyle measures.  Patient currently takes atorvastatin 80 mg, Zetia 10 mg.  Most recent lipid panel done on 12/10/2020 was notable for triglycerides at 328 and LDL of 106.  Patient does not follow a low-carb, low-fat diet.  We will recheck her lipid panel in 3 months.  We have counseled her today on healthy diet and physical activity as tolerated.    Prediabetes  Chronic condition for this patient.  Patient's most recent A1c done 12/10/2020 was 6.4%.  Patient is not following a diabetic diet nor has she made any significant changes to her diet recently.  She admits that her diet has not been the healthiest.  We have counseled her on the importance of low-carb, low-fat diet with lean protein and increased vegetable.  She states she will try to change her diet.    BMI 45.0-49.9, adult ([facility])  Body mass index is 45.14 kg/m². Patient has been diagnosed with obesity and again has  "been counseled on caloric and carbohydrate restriction along with increasing exercise as tolerated to 30 minutes 5 or more times a week.  Patient recently had hip replacement surgery so she is slowly building up to her physical activity levels.    History of right hip replacement  Patient had a total right hip arthroplasty 4/26/2021.  She is still in the recovery phase but is doing well.  She is impatient with the healing process and wants to get back to doing the things that she normally does.  I encouraged her to take it easy and follow the doctor's orders so that she heals properly.  She will continue doing her physical therapy and following with orthopedic surgeon.    History of fusion of cervical spine  Patient had an anterior cervical discectomy and fusion of C5-6, C6-7 in 12/2011.  She did well for a long time but is recently started developing numbness and tingling in the fingers of her left hand.  She is having no neck or back pain, no shoulder pain, no elbow or wrist pain.  She states nothing makes it better or worse \"it is just there\".  We have requested imaging reports from prior PCP.  If we do not receive them in a timely manner we will repeat imaging on her neck to reevaluate for stenosis.    Chronic respiratory failure with hypoxia, on home oxygen therapy (HCC)  Chronic condition for the last 8 years.  Patient currently on continuous O2 at 2 L/min.  O2 saturation in clinic today is 97% on 2 L.      Patient Active Problem List    Diagnosis Date Noted   • Simple chronic bronchitis (HCC) 07/01/2021   • Chronic hyponatremia 07/01/2021   • History of right hip replacement 05/18/2021   • Arthritis 02/23/2021   • Bipolar affective disorder, currently depressed, mild ([facility]) 02/23/2021   • DDD (degenerative disc disease), thoracolumbar 02/23/2021   • Dysphagia 02/23/2021   • Episodic tension-type headache, not intractable 02/23/2021   • GERD without esophagitis 02/23/2021   • Hepatic steatosis 02/23/2021   • " Long term (current) use of anticoagulants 02/23/2021   • Murmur 02/23/2021   • Prediabetes 02/23/2021   • Solitary pulmonary nodule 02/23/2021   • Obstructive sleep apnea of adult 10/25/2020   • Paroxysmal atrial fibrillation (HCC) 10/25/2020   • BMI 45.0-49.9, adult ([facility]) 12/31/2018   • Chronic respiratory failure with hypoxia, on home oxygen therapy ([facility]) 02/01/2018   • History of fusion of cervical spine 12/29/2011   • Essential hypertension 05/19/2011   • Acquired hypothyroidism 05/19/2011   • Elevated liver enzymes 05/19/2011   • Chronic obstructive pulmonary disease (HCC) 09/25/2010   • Cigarette nicotine dependence in remission 09/24/2010   • Bipolar 2 disorder ([facility]) 09/24/2010   • DDD (degenerative disc disease), lumbar 09/24/2010   • Mixed hyperlipidemia 09/24/2010       Current Outpatient Medications   Medication Sig Dispense Refill   • hydroCHLOROthiazide (HYDRODIURIL) 25 MG Tab Take 25 mg by mouth every day.     • XARELTO 20 MG Tab tablet Take 20 mg by mouth every day.     • ezetimibe (ZETIA) 10 MG Tab Take 10 mg by mouth every day.     • DULoxetine (CYMBALTA) 20 MG Cap DR Particles Take 20 mg by mouth 2 times a day.     • trazodone (DESYREL) 300 MG tablet Take 300 mg by mouth at bedtime.     • Home Care Oxygen 2 LPM continuous     • pantoprazole (PROTONIX) 20 MG tablet Take 1 tablet by mouth every day. 90 tablet 0   • metoprolol SR (TOPROL XL) 100 MG TABLET SR 24 HR Take 100 mg by mouth every day.     • ADVAIR DISKUS 500-50 MCG/DOSE AEROSOL POWDER, BREATH ACTIVATED      • Fluticasone-Umeclidin-Vilant (TRELEGY ELLIPTA) 100-62.5-25 MCG/INH AEROSOL POWDER, BREATH ACTIVATED Inhale 1 Puff by mouth every day. 1 Each 11   • albuterol (PROAIR HFA) 108 (90 Base) MCG/ACT Aero Soln inhalation aerosol Inhale 2 Puffs by mouth every four hours as needed for Shortness of Breath. 1 Inhaler 11   • losartan (COZAAR) 100 MG Tab Take 100 mg by mouth every day.  3   • lubiprostone (AMITIZA) 24 MCG capsule Take 24 mcg by  mouth 2 times a day, with meals.     • atorvastatin (LIPITOR) 80 MG tablet Take 80 mg by mouth every evening.     • risperiDONE (RISPERDAL) 3 MG Tab Take 3 mg by mouth every day.     • carbamazepine (TEGRETOL) 200 MG Tab Take 200 mg by mouth 3 times a day.     • levothyroxine (SYNTHROID) 75 MCG Tab TK 1 T PO QD (Patient not taking: Reported on 7/1/2021)  3     No current facility-administered medications for this visit.         Allergies as of 07/01/2021 - Reviewed 07/01/2021   Allergen Reaction Noted   • Codeine Vomiting and Rash 08/10/2016   • Depakote [divalproex sodium] Myalgia 01/27/2018   • Sulfa drugs Rash and Anaphylaxis 09/13/2010   • Valproic acid Unspecified 01/27/2018        Social History     Socioeconomic History   • Marital status:      Spouse name: Not on file   • Number of children: 3   • Years of education: Not on file   • Highest education level: 7th grade   Occupational History   • Occupation: Retired   Tobacco Use   • Smoking status: Former Smoker     Packs/day: 2.00     Years: 40.00     Pack years: 80.00     Types: Cigarettes     Quit date: 12/2017     Years since quitting: 3.5   • Smokeless tobacco: Never Used   • Tobacco comment: Continued abstinence   Vaping Use   • Vaping Use: Never used   Substance and Sexual Activity   • Alcohol use: No   • Drug use: No   • Sexual activity: Yes     Partners: Male     Birth control/protection: Post-Menopausal   Other Topics Concern   • Not on file   Social History Narrative   • Not on file     Social Determinants of Health     Financial Resource Strain:    • Difficulty of Paying Living Expenses:    Food Insecurity:    • Worried About Running Out of Food in the Last Year:    • Ran Out of Food in the Last Year:    Transportation Needs:    • Lack of Transportation (Medical):    • Lack of Transportation (Non-Medical):    Physical Activity:    • Days of Exercise per Week:    • Minutes of Exercise per Session:    Stress:    • Feeling of Stress :     Social Connections:    • Frequency of Communication with Friends and Family:    • Frequency of Social Gatherings with Friends and Family:    • Attends Zoroastrianism Services:    • Active Member of Clubs or Organizations:    • Attends Club or Organization Meetings:    • Marital Status:    Intimate Partner Violence:    • Fear of Current or Ex-Partner:    • Emotionally Abused:    • Physically Abused:    • Sexually Abused:        Family History   Problem Relation Age of Onset   • Heart Disease Mother    • Parkinson's Disease Mother    • Asthma Mother    • Hyperlipidemia Mother    • Hypertension Mother    • Alcohol abuse Mother    • Cancer Father         lung   • Alcohol abuse Father    • GI Disease Sister         cirrhosis   • Alcohol abuse Sister    • Other Brother         Car accident   • Lung Disease Sister         Emphysema   • Alzheimer's Disease Sister    • Cancer Sister         lung   • Psychiatric Illness Sister        Past Surgical History:   Procedure Laterality Date   • ARTHROPLASTY Right 05/2021   • CERVICAL DISK AND FUSION ANTERIOR  12/29/2011    Performed by MICKEY BOLIVAR at [facility]   • ABDOMINAL EXPLORATION     • EYE SURGERY      Cataracts X2   • HYSTERECTOMY LAPAROSCOPY     • HYSTERECTOMY, TOTAL ABDOMINAL     • LAMINOTOMY     • LUMBAR FUSION POSTERIOR     • PB REMV 2ND CATARACT,CORN-SCLER SECTN         Review of Systems:   Constitutional: Negative for fever, chills, weight change, fatigue, loss of appetite.  HNT: Negative for nosebleeds, congestion, odynophagia, sore throat or changes in taste.    Eyes: Negative for vision changes.   Ears: Negative for recent hearing changes, pain or discharge.  Neck: Negative for pain, swelling, lumps or goiter.  Respiratory: Negative for cough, sputum production, shortness of breath and wheezing.    Cardiovascular: Negative for chest pain, palpitations, orthopnea and leg swelling.   Gastrointestinal: Negative for constipation, diarrhea, heartburn,  "dysphagia, nausea, vomiting or abdominal pain.   Genitourinary: Negative for dysuria, urgency and frequency.   Musculoskeletal: Negative for myalgias, joint pain, and back pain.  Skin: Negative for skin, hair or nail changes, rash, itching.   Neurological: Positive for numbness and tingling in the fingers of left hand.  Negative for dizziness, other tingling, tremors, sensory change, gait/coordination changes, focal weakness and headaches.   Endo/Heme/Allergies: Does not bruise/bleed easily.   Psychiatric/Behavioral: Positive for bipolar disorder with depression, anxiety.  Negative for suicidal ideas and memory loss.  The patient does not have insomnia.      Patient's last menstrual period was 09/24/2010.     Physical Exam:  /62   Pulse 67   Temp 36.4 °C (97.5 °F) (Temporal)   Resp 14   Ht 1.626 m (5' 4\")   Wt 119 kg (263 lb)   LMP 09/24/2010   SpO2 97%   BMI 45.14 kg/m²    General:  Well nourished, well developed female. With a Body mass index is 45.14 kg/m². No apparent distress.  Eyes: EOM intact, PERRL, conjunctiva non-injected, sclera non-icteric.  Neck: Neck supple with no cervical lymphadenopathy, JVD, palpable thyroid nodules or carotid bruits.  Pulmonary: Clear to ausculation bilaterally. Normal effort. No rales, ronchi, or wheezing.  Cardiovascular: Regular rate and rhythm without murmur, rub or gallop.   Extremities: Full range of motion. Warm and well perfused with no edema.  Skin: Intact with no obvious rashes or lesions.  Neuro: Cranial nerves I-XII grossly intact.  Psych: Alert and oriented x 3.  Appropriately dressed. Mood and affect appropriate.    Assessment/Plan:    1. Other dysphagia  pantoprazole (PROTONIX) 20 MG tablet   2. GERD without esophagitis  pantoprazole (PROTONIX) 20 MG tablet   3. Chronic respiratory failure with hypoxia, on home oxygen therapy (HCC)     4. BMI 45.0-49.9, adult ([facility])     5. History of right hip replacement     6. Acquired hypothyroidism     7. Chronic " hyponatremia     8. Mixed hyperlipidemia     9. Prediabetes     10. History of fusion of cervical spine         Reviewed risks and benefits of treatment plan. Patient verbally agrees to plan of care.     Return in about 2 weeks (around 7/15/2021) for f/u blood pressure.      Please note that this dictation was created using voice recognition software. I have made every reasonable attempt to correct obvious errors, but I expect that there are errors of grammar and possibly content that I did not discover before finalizing the note.

## 2021-07-02 NOTE — ASSESSMENT & PLAN NOTE
Chronic condition for the last 8 years.  Patient currently on continuous O2 at 2 L/min.  O2 saturation in clinic today is 97% on 2 L.

## 2021-07-02 NOTE — ASSESSMENT & PLAN NOTE
Patient had a total right hip arthroplasty 4/26/2021.  She is still in the recovery phase but is doing well.  She is impatient with the healing process and wants to get back to doing the things that she normally does.  I encouraged her to take it easy and follow the doctor's orders so that she heals properly.  She will continue doing her physical therapy and following with orthopedic surgeon.

## 2021-07-02 NOTE — ASSESSMENT & PLAN NOTE
Chronic condition for this patient.  Patient's most recent A1c done 12/10/2020 was 6.4%.  Patient is not following a diabetic diet nor has she made any significant changes to her diet recently.  She admits that her diet has not been the healthiest.  We have counseled her on the importance of low-carb, low-fat diet with lean protein and increased vegetable.  She states she will try to change her diet.

## 2021-07-02 NOTE — ASSESSMENT & PLAN NOTE
Chronic condition for this patient.  Most recent CMP shows sodium at 131.  Patient has been encouraged to add a little bit of sodium to her diet.  She has been instructed not to go overboard.  Patient sodium levels have been mildly decreased for the last 2 years.  We will continue to monitor.

## 2021-07-02 NOTE — ASSESSMENT & PLAN NOTE
The patient's chronic condition is well controlled on the current therapy with no new symptoms or worsening. Labs done 04/24/21 TSH 2.70, Free T4 0.69.  States that she stopped taking her thyroid medication because she did not believe she needed it.  We requested records from her previous PCP.  She will follow-up with us in 2 weeks.  We will recheck labs in 3 months.

## 2021-07-02 NOTE — ASSESSMENT & PLAN NOTE
"Patient had an anterior cervical discectomy and fusion of C5-6, C6-7 in 12/2011.  She did well for a long time but is recently started developing numbness and tingling in the fingers of her left hand.  She is having no neck or back pain, no shoulder pain, no elbow or wrist pain.  She states nothing makes it better or worse \"it is just there\".  We have requested imaging reports from prior PCP.  If we do not receive them in a timely manner we will repeat imaging on her neck to reevaluate for stenosis.  "

## 2021-07-02 NOTE — ASSESSMENT & PLAN NOTE
This is a chronic health problem that is uncontrolled with current medications and lifestyle measures.  Patient currently takes atorvastatin 80 mg, Zetia 10 mg.  Most recent lipid panel done on 12/10/2020 was notable for triglycerides at 328 and LDL of 106.  Patient does not follow a low-carb, low-fat diet.  We will recheck her lipid panel in 3 months.  We have counseled her today on healthy diet and physical activity as tolerated.

## 2021-07-02 NOTE — ASSESSMENT & PLAN NOTE
Body mass index is 45.14 kg/m². Patient has been diagnosed with obesity and again has been counseled on caloric and carbohydrate restriction along with increasing exercise as tolerated to 30 minutes 5 or more times a week.  Patient recently had hip replacement surgery so she is slowly building up to her physical activity levels.

## 2021-07-15 ENCOUNTER — OFFICE VISIT (OUTPATIENT)
Dept: MEDICAL GROUP | Facility: CLINIC | Age: 66
End: 2021-07-15
Payer: MEDICARE

## 2021-07-15 VITALS
HEART RATE: 68 BPM | TEMPERATURE: 97.5 F | DIASTOLIC BLOOD PRESSURE: 78 MMHG | HEIGHT: 64 IN | WEIGHT: 263 LBS | OXYGEN SATURATION: 99 % | RESPIRATION RATE: 12 BRPM | SYSTOLIC BLOOD PRESSURE: 122 MMHG | BODY MASS INDEX: 44.9 KG/M2

## 2021-07-15 DIAGNOSIS — I10 ESSENTIAL HYPERTENSION: ICD-10-CM

## 2021-07-15 DIAGNOSIS — Z78.0 ASYMPTOMATIC MENOPAUSAL STATE: ICD-10-CM

## 2021-07-15 DIAGNOSIS — Z91.89 AT RISK FOR DECREASED BONE DENSITY: ICD-10-CM

## 2021-07-15 DIAGNOSIS — R73.03 PREDIABETES: ICD-10-CM

## 2021-07-15 DIAGNOSIS — F31.31 BIPOLAR AFFECTIVE DISORDER, CURRENTLY DEPRESSED, MILD (HCC): ICD-10-CM

## 2021-07-15 DIAGNOSIS — Z12.12 SCREENING FOR COLORECTAL CANCER: ICD-10-CM

## 2021-07-15 DIAGNOSIS — J96.11 CHRONIC RESPIRATORY FAILURE WITH HYPOXIA, ON HOME OXYGEN THERAPY (HCC): ICD-10-CM

## 2021-07-15 DIAGNOSIS — E78.2 MIXED HYPERLIPIDEMIA: ICD-10-CM

## 2021-07-15 DIAGNOSIS — J41.0 SIMPLE CHRONIC BRONCHITIS (HCC): ICD-10-CM

## 2021-07-15 DIAGNOSIS — Z12.31 ENCOUNTER FOR SCREENING MAMMOGRAM FOR BREAST CANCER: ICD-10-CM

## 2021-07-15 DIAGNOSIS — Z99.81 CHRONIC RESPIRATORY FAILURE WITH HYPOXIA, ON HOME OXYGEN THERAPY (HCC): ICD-10-CM

## 2021-07-15 DIAGNOSIS — E55.9 VITAMIN D DEFICIENCY: ICD-10-CM

## 2021-07-15 DIAGNOSIS — Z11.9 SCREENING EXAMINATION FOR INFECTIOUS DISEASE: ICD-10-CM

## 2021-07-15 DIAGNOSIS — Z12.11 SCREENING FOR COLORECTAL CANCER: ICD-10-CM

## 2021-07-15 PROCEDURE — 99213 OFFICE O/P EST LOW 20 MIN: CPT | Performed by: PHYSICIAN ASSISTANT

## 2021-07-15 NOTE — PROGRESS NOTES
Chief Complaint   Patient presents with   • Hypertension       HISTORY OF PRESENT ILLNESS: Patient is a 65 y.o. female established patient who presents today to discuss the following issues:    Essential hypertension  The patient's chronic condition is well controlled on the current therapy with no new symptoms or worsening. Currently on losartan 100 mg daily, metoprolol 100 mg daily. Pressure in the office today is 122/78. Currently managed by cardiology. She will continue taking medications as prescribed. Will follow up in 3 months with routine fasting labs.      Patient Active Problem List    Diagnosis Date Noted   • Simple chronic bronchitis (HCC) 07/01/2021   • Chronic hyponatremia 07/01/2021   • History of right hip replacement 05/18/2021   • Arthritis 02/23/2021   • Bipolar affective disorder, currently depressed, mild (Former[facility]) 02/23/2021   • DDD (degenerative disc disease), thoracolumbar 02/23/2021   • Dysphagia 02/23/2021   • Episodic tension-type headache, not intractable 02/23/2021   • GERD without esophagitis 02/23/2021   • Hepatic steatosis 02/23/2021   • Long term (current) use of anticoagulants 02/23/2021   • Murmur 02/23/2021   • Prediabetes 02/23/2021   • Solitary pulmonary nodule 02/23/2021   • Obstructive sleep apnea of adult 10/25/2020   • Paroxysmal atrial fibrillation ([facility] [place]) 10/25/2020   • BMI 45.0-49.9, adult ([facility]) 12/31/2018   • Chronic respiratory failure with hypoxia, on home oxygen therapy ([facility]) 02/01/2018   • History of fusion of cervical spine 12/29/2011   • Essential hypertension 05/19/2011   • Acquired hypothyroidism 05/19/2011   • Elevated liver enzymes 05/19/2011   • Chronic obstructive pulmonary disease (HCC) 09/25/2010   • Cigarette nicotine dependence in remission 09/24/2010   • Bipolar 2 disorder (HCC) 09/24/2010   • DDD (degenerative disc disease), lumbar 09/24/2010   • Mixed hyperlipidemia 09/24/2010       Allergies:Codeine, Depakote [divalproex sodium], Sulfa drugs, and Valproic  acid    Current Outpatient Medications   Medication Sig Dispense Refill   • rivaroxaban (XARELTO) 20 MG Tab tablet TAKE 1 TABLET BY MOUTH ONCE DAILY WITH FOOD FOR 30 DAYS *  DISCONTINUE  WARFARIN.     • hydroCHLOROthiazide (HYDRODIURIL) 25 MG Tab Take 25 mg by mouth every day.     • ezetimibe (ZETIA) 10 MG Tab Take 10 mg by mouth every day.     • DULoxetine (CYMBALTA) 20 MG Cap DR Particles Take 20 mg by mouth 2 times a day.     • trazodone (DESYREL) 300 MG tablet Take 300 mg by mouth at bedtime.     • Home Care Oxygen 2 LPM continuous     • pantoprazole (PROTONIX) 20 MG tablet Take 1 tablet by mouth every day. 90 tablet 0   • metoprolol SR (TOPROL XL) 100 MG TABLET SR 24 HR Take 100 mg by mouth every day.     • albuterol (PROVENTIL) 2.5mg/3ml Nebu Soln solution for nebulization Take 3 mL by nebulization every 6 hours as needed.     • ADVAIR DISKUS 500-50 MCG/DOSE AEROSOL POWDER, BREATH ACTIVATED      • Fluticasone-Umeclidin-Vilant (TRELEGY ELLIPTA) 100-62.5-25 MCG/INH AEROSOL POWDER, BREATH ACTIVATED Inhale 1 Puff by mouth every day. 1 Each 11   • albuterol (PROAIR HFA) 108 (90 Base) MCG/ACT Aero Soln inhalation aerosol Inhale 2 Puffs by mouth every four hours as needed for Shortness of Breath. 1 Inhaler 11   • losartan (COZAAR) 100 MG Tab Take 100 mg by mouth every day.  3   • lubiprostone (AMITIZA) 24 MCG capsule Take 24 mcg by mouth 2 times a day, with meals.     • atorvastatin (LIPITOR) 80 MG tablet Take 80 mg by mouth every evening.     • risperiDONE (RISPERDAL) 3 MG Tab Take 3 mg by mouth every day.     • carbamazepine (TEGRETOL) 200 MG Tab Take 200 mg by mouth 3 times a day.     • levothyroxine (SYNTHROID) 75 MCG Tab TK 1 T PO QD (Patient not taking: Reported on 7/1/2021)  3     No current facility-administered medications for this visit.       Hospital Outpatient Visit on 03/01/2021   Component Date Value Ref Range Status   • Sed Rate Westergren 03/01/2021 <1  0 - 30 mm/hour Final   • Stat C-Reactive  Protein 2021 0.23  0.00 - 0.75 mg/dL Final   • Sodium 2021 132* 135 - 145 mmol/L Final   • Potassium 2021 4.1  3.6 - 5.5 mmol/L Final   • Chloride 2021 94* 96 - 112 mmol/L Final   • Co2 2021 30  20 - 33 mmol/L Final   • Anion Gap 2021 8.0  7.0 - 16.0 Final   • Glucose 2021 96  65 - 99 mg/dL Final   • Bun 2021 15  8 - 22 mg/dL Final   • Creatinine 2021 0.52  0.50 - 1.40 mg/dL Final   • Calcium 2021 9.5  8.5 - 10.5 mg/dL Final   • AST(SGOT) 2021 34  12 - 45 U/L Final   • ALT(SGPT) 2021 46  2 - 50 U/L Final   • Alkaline Phosphatase 2021 121* 30 - 99 U/L Final   • Total Bilirubin 2021 <0.2  0.1 - 1.5 mg/dL Final   • Albumin 2021 3.9  3.2 - 4.9 g/dL Final   • Total Protein 2021 6.8  6.0 - 8.2 g/dL Final   • Globulin 2021 2.9  1.9 - 3.5 g/dL Final   • A-G Ratio 2021 1.3  g/dL Final   • GFR If  2021 >60  >60 mL/min/1.73 m 2 Final   • GFR If Non  2021 >60  >60 mL/min/1.73 m 2 Final   ]    The 10-year ASCVD risk score (Daniella GALARZA Jr., et al., 2013) is: 8.6%    Values used to calculate the score:      Age: 65 years      Sex: Female      Is Non- : No      Diabetic: No      Tobacco smoker: No      Systolic Blood Pressure: 122 mmHg      Is BP treated: Yes      HDL Cholesterol: 46 mg/dL      Total Cholesterol: 184 mg/dL    Social History     Tobacco Use   • Smoking status: Former Smoker     Packs/day: 2.00     Years: 40.00     Pack years: 80.00     Types: Cigarettes     Quit date: 2017     Years since quitting: 3.6   • Smokeless tobacco: Never Used   • Tobacco comment: Continued abstinence   Vaping Use   • Vaping Use: Never used   Substance Use Topics   • Alcohol use: No   • Drug use: No       Family Status   Relation Name Status   • Mo          parkinsons   • Fa   at age 67        cancer - metast   • Sis  Alive   • Bro     • MGMo      • MGFa     • PGMo     • PGFa     • Sis     • Sis     • Sis     • Sis       Family History   Problem Relation Age of Onset   • Heart Disease Mother    • Parkinson's Disease Mother    • Asthma Mother    • Hyperlipidemia Mother    • Hypertension Mother    • Alcohol abuse Mother    • Cancer Father         lung   • Alcohol abuse Father    • GI Disease Sister         cirrhosis   • Alcohol abuse Sister    • Other Brother         Car accident   • Lung Disease Sister         Emphysema   • Alzheimer's Disease Sister    • Cancer Sister         lung   • Psychiatric Illness Sister        Patient's last menstrual period was 2010.    Health Maintenance Summary                Annual Wellness Visit Overdue 1953     HEPATITIS C SCREENING Overdue 1953     COVID-19 Vaccine Overdue 1965     PAP SMEAR Overdue 1974     COLON CANCER SCREENING ANNUAL FIT Overdue 2003     Annual Pulmonary Function Test / Spirometry Overdue 2012      Done 2011 PULMONARY FUNCTION TEST/BEDSIDE     Patient has more history with this topic...    MAMMOGRAM Overdue 2017      Done 2016 [id number]-[id number] (DIAGNOSTIC)     Patient has more history with this topic...    BONE DENSITY Overdue 2018     IMM ZOSTER VACCINES Overdue 2020      Done 10/18/2020 Imm Admin: Zoster Vaccine Recombinant (RZV) (SHINGRIX)     Patient has more history with this topic...    IMM INFLUENZA Next Due 2021      Done 10/14/2019 Imm Admin: Influenza Vaccine Adult HD     Patient has more history with this topic...    IMM PNEUMOCOCCAL VACCINE: 65+ Years Next Due 10/18/2025      Done 10/18/2020 Imm Admin: Pneumococcal Conjugate Vaccine (Prevnar/PCV-13)     Patient has more history with this topic...    IMM DTaP/Tdap/Td Vaccine Next Due 10/5/2028      Done 10/5/2018 Imm Admin: TD Vaccine     Patient has more history with this topic...           Review of Systems:  "  Constitutional: Negative for fever, chills, weight change, fatigue, loss of appetite.  HNT: Negative for nosebleeds, congestion, odynophagia, sore throat or changes in taste.    Eyes: Negative for vision changes.   Ears: Negative for recent changes in hearing, pain or discharge.  Neck: Negative for pain, swelling, lumps or goiter.  Respiratory: Chronic shortness of breath. On 2L O2 continuous. Negative for cough, sputum production and wheezing.    Cardiovascular: Negative for chest pain, palpitations, orthopnea and leg swelling.   Gastrointestinal: Negative for constipation, diarrhea, heartburn, dysphagia, nausea, vomiting or abdominal pain.   Genitourinary: Negative for dysuria, urgency and frequency.   Musculoskeletal: Negative for myalgias, joint pain, and back pain.  Skin: Negative for skin, hair or nail changes, rash, itching.   Neurological: Negative for dizziness, tingling, tremors, sensory change, gait/coordination changes, focal weakness and headaches.   Endo/Heme/Allergies: Does not bruise/bleed easily.   Psychiatric/Behavioral: Negative for depression, suicidal ideas and memory loss.  The patient is not nervous/anxious and does not have insomnia.        Exam:  /78 (BP Location: Left arm, Patient Position: Sitting, BP Cuff Size: Adult)   Pulse 68   Temp 36.4 °C (97.5 °F) (Temporal)   Resp 12   Ht 1.626 m (5' 4\")   Wt 119 kg (263 lb)   SpO2 99%  Body mass index is 45.14 kg/m².  General:  Well nourished, well developed female. Body mass index is 45.14 kg/m². No apparent distress.  Eyes: EOM intact, PERRL, conjunctiva non-injected, sclera non-icteric.  Neck: Supple with no cervical lymphadenopathy, JVD, palpable thyroid nodules or carotid bruits.  Pulmonary: Decreased breath sounds bilaterally. Normal effort. No rales, ronchi, or wheezing.  Cardiovascular: Regular rate and rhythm without murmur, rub or gallop.   Extremities: Moderately decreased range of motion bilateral lower extremities.. Warm " and well perfused with no edema.  Skin: Intact with no obvious rashes or lesions.  Neuro: Cranial nerves I-XII intact.  Psych: Alert and oriented x 3.  Appropriately dressed. Mood and affect appropriate.      Assessment/Plan:  1. Mixed hyperlipidemia  Lipid Profile   2. Prediabetes  HEMOGLOBIN A1C   3. Vitamin D deficiency  VITAMIN D,25 HYDROXY   4. Essential hypertension     5. Bipolar affective disorder, currently depressed, mild (HCC)     6. BMI 45.0-49.9, adult (HCC)  Patient identified as having weight management issue.  Appropriate orders and counseling given.   7. Screening for colorectal cancer  OCCULT BLOOD FECES IMMUNOASSAY (FIT)   8. Encounter for screening mammogram for breast cancer  MA-SCREENING MAMMO BILAT W/TOMOSYNTHESIS W/CAD   9. At risk for decreased bone density  DS-BONE DENSITY STUDY (DEXA)   10. Screening examination for infectious disease  HCV Scrn ( 2781-0893 1xLife)   11. Asymptomatic menopausal state   DS-BONE DENSITY STUDY (DEXA)   12. Simple chronic bronchitis (HCC)  PULMONARY FUNCTION TESTS -Test requested: Complete Pulmonary Function Test   13. Chronic respiratory failure with hypoxia, on home oxygen therapy (HCC)  PULMONARY FUNCTION TESTS -Test requested: Complete Pulmonary Function Test       Reviewed risks and benefits of treatment plan. Patient verbally agrees to plan of care.     Return in about 3 months (around 10/15/2021) for f/u labs.    Please note that this dictation was created using voice recognition software. I have made every reasonable attempt to correct obvious errors, but I expect that there are errors of grammar and possibly content that I did not discover before finalizing the note.

## 2021-07-16 NOTE — ASSESSMENT & PLAN NOTE
The patient's chronic condition is well controlled on the current therapy with no new symptoms or worsening. Currently on losartan 100 mg daily, metoprolol 100 mg daily. Pressure in the office today is 122/78. Currently managed by cardiology. She will continue taking medications as prescribed. Will follow up in 3 months with routine fasting labs.

## 2021-08-24 ENCOUNTER — OFFICE VISIT (OUTPATIENT)
Dept: MEDICAL GROUP | Facility: CLINIC | Age: 66
End: 2021-08-24
Payer: MEDICARE

## 2021-08-24 VITALS
HEIGHT: 64 IN | RESPIRATION RATE: 18 BRPM | WEIGHT: 252.8 LBS | OXYGEN SATURATION: 97 % | BODY MASS INDEX: 43.16 KG/M2 | SYSTOLIC BLOOD PRESSURE: 110 MMHG | HEART RATE: 64 BPM | TEMPERATURE: 97.7 F | DIASTOLIC BLOOD PRESSURE: 60 MMHG

## 2021-08-24 DIAGNOSIS — J96.11 CHRONIC RESPIRATORY FAILURE WITH HYPOXIA, ON HOME OXYGEN THERAPY (HCC): ICD-10-CM

## 2021-08-24 DIAGNOSIS — J06.9 VIRAL UPPER RESPIRATORY TRACT INFECTION: ICD-10-CM

## 2021-08-24 DIAGNOSIS — Z99.81 CHRONIC RESPIRATORY FAILURE WITH HYPOXIA, ON HOME OXYGEN THERAPY (HCC): ICD-10-CM

## 2021-08-24 DIAGNOSIS — J41.0 SIMPLE CHRONIC BRONCHITIS (HCC): ICD-10-CM

## 2021-08-24 PROCEDURE — 99213 OFFICE O/P EST LOW 20 MIN: CPT | Performed by: PHYSICIAN ASSISTANT

## 2021-08-24 RX ORDER — DOXYCYCLINE HYCLATE 100 MG
TABLET ORAL
COMMUNITY
Start: 2021-08-21 | End: 2021-08-30

## 2021-08-24 RX ORDER — IPRATROPIUM BROMIDE AND ALBUTEROL 20; 100 UG/1; UG/1
SPRAY, METERED RESPIRATORY (INHALATION)
COMMUNITY
Start: 2021-08-21 | End: 2023-12-29

## 2021-08-24 RX ORDER — PREDNISONE 20 MG/1
TABLET ORAL
COMMUNITY
Start: 2021-08-21 | End: 2021-08-30

## 2021-08-25 ASSESSMENT — ENCOUNTER SYMPTOMS
PSYCHIATRIC NEGATIVE: 1
MUSCULOSKELETAL NEGATIVE: 1
GASTROINTESTINAL NEGATIVE: 1
CARDIOVASCULAR NEGATIVE: 1
NAUSEA: 0
EYES NEGATIVE: 1
COUGH: 1
NEUROLOGICAL NEGATIVE: 1
SHORTNESS OF BREATH: 1
VOMITING: 0
CONSTITUTIONAL NEGATIVE: 1
FEVER: 0
SPUTUM PRODUCTION: 1
WHEEZING: 0

## 2021-08-25 ASSESSMENT — VISUAL ACUITY: OU: 1

## 2021-08-25 NOTE — PROGRESS NOTES
Subjective     Herlinda Gill is a 65 y.o. female who presents with Nasal Congestion (cold like symptoms- went to er and having hard time breathing and has mucous)    1. Simple chronic bronchitis (HCC)  Was seen in the ER 08/21/21 for an exacerbation of her COPD due to air quality. Chest x-ray was clear but was treated for bronchopneumonia with doxycycline and prednisone. She told the ER that she was not using her inhalers as directed but she denies this today. Lungs are clear to ascultation currently. Still complaining of congestion that she cannot clear from her chest.     2. Chronic respiratory failure with hypoxia, on home oxygen therapy (HCC)  On chronic oxygen therapy with 3L continuous flow. Oxygen saturation today is 98%.    3. Viral upper respiratory tract infection  Complaining of sinus congestion, productive cough. States that she cannot cough out the mucus in her lungs. When she coughs in the clinic the mucus sounds like it is in the back of her throat and not in the lungs. She does have maxillary and frontal sinus pressure but no pain. Post nasal drip present, mucus non-purulent but thick. Have discussed staying well hydrated, drinking warm fluids to thin mucus in the back of her throat and the use of nasal decongestant for 2 days only. She habitually takes musinex for chest congestion. Denies fever, body aches, nausea or vomiting currently. Admits that her abdomen hurts from coughing so much. Have instructed her to stay out of the smoke as much as possible.    Past Medical History:  No date: Allergic rhinitis      Comment:  codeine, sulfa, depakote, seasonal  No date: Anemia  No date: Anxiety  No date: Arthritis  No date: Atrial fibrillation (Former[facility])      Comment:  intermittent  No date: Back pain  No date: Bipolar affective disorder ([facility])  No date: Breath shortness  No date: Bronchitis  No date: Cataract      Comment:  removed bilateral 2016  No date: Chest tightness  No date: Chickenpox  No date:  Constipation  No date: COPD (chronic obstructive pulmonary disease) ([facility])  No date: Daytime sleepiness  No date: Depression  No date: Diabetes ([facility])      Comment:  pre  10/25/2020: Dyspnea on exertion      Comment:  Last Assessment & Plan:  Formatting of this note might                be different from the original. Lexiscan MPI 2/20/2020:                EF 55% NMWA, elevated TID 1.45, SDS 3, NDI or MI. Patient               has had coronary angiography which reveals normal                coronaries.  False positive stress testing.  Holter                monitoring 11/27/19: NSR ave 75, min 54/max 120, no Afib,               rare PAC, no symptoms.  GDMT and risk factor                modification.  No date: GERD (gastroesophageal reflux disease)  No date: Hyperlipidemia  No date: Hypertension  12/31/2018: Hypoxia  No date: IBD (inflammatory bowel disease)      Comment:  constipation  No date: Indigestion  No date: Lumbar disc disease  No date: Menopause  No date: Migraine  No date: Morning headache  2/23/2021: Murmur  No date: Nasal drainage  No date: Obesity  No date: Pneumonia  4/26/2021: Primary osteoarthritis of right hip  No date: Pulmonary emphysema ([facility])  No date: Shortness of breath  No date: Sinusitis  No date: Sleep apnea      Comment:  CPAP and O2 at night  No date: Thyroid disease  No date: Wears glasses  No date: Wheezing  Past Surgical History:  05/2021: ARTHROPLASTY; Right  12/29/2011: CERVICAL DISK AND FUSION ANTERIOR      Comment:  Performed by MICKEY BOLIVAR at SURGERY [facility]  No date: ABDOMINAL EXPLORATION  No date: EYE SURGERY      Comment:  Cataracts X2  No date: HYSTERECTOMY LAPAROSCOPY  No date: HYSTERECTOMY, TOTAL ABDOMINAL  No date: LAMINOTOMY  No date: LUMBAR FUSION POSTERIOR  No date: PB REMV 2ND CATARACT,CORN-SCLER SECTN  Social History    Tobacco Use      Smoking status: Former Smoker        Packs/day: 2.00        Years: 40.00        Pack years: 80        Types: Cigarettes         Quit date: 12/2017        Years since quitting: 3.7      Smokeless tobacco: Never Used      Tobacco comment: Continued abstinence    Vaping Use      Vaping Use: Never used    Alcohol use: No    Drug use: No    Review of patient's family history indicates:  Problem: Heart Disease      Relation: Mother          Age of Onset: (Not Specified)  Problem: Parkinson's Disease      Relation: Mother          Age of Onset: (Not Specified)  Problem: Asthma      Relation: Mother          Age of Onset: (Not Specified)  Problem: Hyperlipidemia      Relation: Mother          Age of Onset: (Not Specified)  Problem: Hypertension      Relation: Mother          Age of Onset: (Not Specified)  Problem: Alcohol abuse      Relation: Mother          Age of Onset: (Not Specified)  Problem: Cancer      Relation: Father          Age of Onset: (Not Specified)          Comment: lung  Problem: Alcohol abuse      Relation: Father          Age of Onset: (Not Specified)  Problem: GI Disease      Relation: Sister          Age of Onset: (Not Specified)          Comment: cirrhosis  Problem: Alcohol abuse      Relation: Sister          Age of Onset: (Not Specified)  Problem: Other      Relation: Brother          Age of Onset: (Not Specified)          Comment: Car accident  Problem: Lung Disease      Relation: Sister          Age of Onset: (Not Specified)          Comment: Emphysema  Problem: Alzheimer's Disease      Relation: Sister          Age of Onset: (Not Specified)  Problem: Cancer      Relation: Sister          Age of Onset: (Not Specified)          Comment: lung  Problem: Psychiatric Illness      Relation: Sister          Age of Onset: (Not Specified)      Current Outpatient Medications: •  rivaroxaban (XARELTO) 20 MG Tab tablet, TAKE 1 TABLET BY MOUTH ONCE DAILY WITH FOOD FOR 30 DAYS *  DISCONTINUE  WARFARIN., Disp: , Rfl: •  hydroCHLOROthiazide (HYDRODIURIL) 25 MG Tab, Take 25 mg by mouth every day., Disp: , Rfl: •  ezetimibe (ZETIA) 10 MG  Tab, Take 10 mg by mouth every day., Disp: , Rfl: •  DULoxetine (CYMBALTA) 20 MG Cap DR Particles, Take 20 mg by mouth 2 times a day., Disp: , Rfl: •  trazodone (DESYREL) 300 MG tablet, Take 300 mg by mouth at bedtime., Disp: , Rfl: •  Home Care Oxygen, 2 LPM continuous, Disp: , Rfl: •  ADVAIR DISKUS 500-50 MCG/DOSE AEROSOL POWDER, BREATH ACTIVATED, , Disp: , Rfl: •  Fluticasone-Umeclidin-Vilant (TRELEGY ELLIPTA) 100-62.5-25 MCG/INH AEROSOL POWDER, BREATH ACTIVATED, Inhale 1 Puff by mouth every day., Disp: 1 Each, Rfl: 11•  albuterol (PROAIR HFA) 108 (90 Base) MCG/ACT Aero Soln inhalation aerosol, Inhale 2 Puffs by mouth every four hours as needed for Shortness of Breath., Disp: 1 Inhaler, Rfl: 11•  losartan (COZAAR) 100 MG Tab, Take 100 mg by mouth every day., Disp: , Rfl: 3 •  lubiprostone (AMITIZA) 24 MCG capsule, Take 24 mcg by mouth 2 times a day, with meals., Disp: , Rfl: •  atorvastatin (LIPITOR) 80 MG tablet, Take 80 mg by mouth every evening., Disp: , Rfl: •  risperiDONE (RISPERDAL) 3 MG Tab, Take 3 mg by mouth every day., Disp: , Rfl:  •  carbamazepine (TEGRETOL) 200 MG Tab, Take 200 mg by mouth 3 times a day., Disp: , Rfl: •  predniSONE (DELTASONE) 20 MG Tab, TAKE 2 TABLETS BY MOUTH ONCE DAILY FOR 5 DAYS, Disp: , Rfl: •  COMBIVENT RESPIMAT  MCG/ACT Aero Soln, INHALE 1 PUFF BY MOUTH EVERY 6 HOURS, Disp: , Rfl: •  doxycycline (VIBRAMYCIN) 100 MG Tab, TAKE 1 TABLET BY MOUTH TWICE DAILY FOR 7 DAYS, Disp: , Rfl: •  pantoprazole (PROTONIX) 20 MG tablet, Take 1 tablet by mouth every day. (Patient not taking: Reported on 8/24/2021), Disp: 90 tablet, Rfl: 0•  metoprolol SR (TOPROL XL) 100 MG TABLET SR 24 HR, Take 100 mg by mouth every day., Disp: , Rfl:     Patient was instructed on the use of medications, either prescriptions or OTC and informed on when the appropriate follow up time period should be. In addition, patient was also instructed that should any acute worsening occur that they should notify this  "clinic asap or call 911.        Review of Systems   Constitutional: Negative.  Negative for fever.   HENT: Negative.    Eyes: Negative.    Respiratory: Positive for cough, sputum production and shortness of breath. Negative for wheezing.    Cardiovascular: Negative.    Gastrointestinal: Negative.  Negative for nausea and vomiting.   Genitourinary: Negative.    Musculoskeletal: Negative.         Abdominal musculature tender due to excessive coughing for 2 weeks.   Skin: Negative.    Neurological: Negative.    Endo/Heme/Allergies: Negative.    Psychiatric/Behavioral: Negative.      Objective     /60   Pulse 64   Temp 36.5 °C (97.7 °F) (Temporal)   Resp 18   Ht 1.626 m (5' 4\")   Wt 115 kg (252 lb 12.8 oz)   LMP 09/24/2010   SpO2 97%   BMI 43.39 kg/m²      Physical Exam  Vitals and nursing note reviewed.   Constitutional:       Appearance: Normal appearance. She is well-developed and well-groomed. She is morbidly obese. She is not ill-appearing or toxic-appearing.      Comments: Has lost 11 pounds in the last 9 weeks. States that she was not trying but has not felt like eating much.   HENT:      Head: Normocephalic and atraumatic.      Nose: Mucosal edema, congestion and rhinorrhea present. Rhinorrhea is clear.      Right Sinus: Maxillary sinus tenderness and frontal sinus tenderness present.      Left Sinus: Maxillary sinus tenderness and frontal sinus tenderness present.      Mouth/Throat:      Lips: Pink. No lesions.      Mouth: Mucous membranes are moist.      Comments: Post nasal drip with clear to white mucus.  Eyes:      General: Lids are normal. Vision grossly intact. Gaze aligned appropriately.      Extraocular Movements: Extraocular movements intact.      Conjunctiva/sclera: Conjunctivae normal.      Pupils: Pupils are equal, round, and reactive to light.   Neck:      Thyroid: No thyromegaly.      Vascular: No carotid bruit or JVD.      Trachea: Trachea and phonation normal.   Cardiovascular:     "  Rate and Rhythm: Normal rate and regular rhythm.      Heart sounds: Normal heart sounds. No murmur heard.   No friction rub. No gallop.    Pulmonary:      Effort: Pulmonary effort is normal.      Breath sounds: Examination of the right-middle field reveals decreased breath sounds. Examination of the left-middle field reveals decreased breath sounds. Examination of the right-lower field reveals decreased breath sounds. Examination of the left-lower field reveals decreased breath sounds. Decreased breath sounds present. No wheezing, rhonchi or rales.   Musculoskeletal:         General: Normal range of motion.      Cervical back: Normal range of motion and neck supple.      Right lower leg: No edema.      Left lower leg: No edema.   Lymphadenopathy:      Cervical: No cervical adenopathy.   Skin:     General: Skin is warm and dry.      Capillary Refill: Capillary refill takes less than 2 seconds.      Findings: No lesion or rash.   Neurological:      Mental Status: She is alert and oriented to person, place, and time.      Cranial Nerves: Cranial nerves are intact.   Psychiatric:         Attention and Perception: Attention and perception normal.         Mood and Affect: Mood and affect normal.         Speech: Speech normal.         Behavior: Behavior normal. Behavior is cooperative.         Thought Content: Thought content normal.         Judgment: Judgment normal.       Assessment & Plan      1. Simple chronic bronchitis (HCC)      2. Chronic respiratory failure with hypoxia, on home oxygen therapy (HCC)      3. Viral upper respiratory tract infection

## 2021-08-27 ENCOUNTER — TELEPHONE (OUTPATIENT)
Dept: MEDICAL GROUP | Facility: CLINIC | Age: 66
End: 2021-08-27

## 2021-08-27 NOTE — TELEPHONE ENCOUNTER
VOICEMAIL  1. Caller Name: Herlinda Gill                        Call Back Number: [phone] (home)       2. Message: Pt called and stated that she can not hold any thing down. Would like to know if you could send something in.     Please advise    3. Patient approves office to leave a detailed voicemail/MyChart message: N\A

## 2021-08-30 ENCOUNTER — HOSPITAL ENCOUNTER (OUTPATIENT)
Facility: MEDICAL CENTER | Age: 66
End: 2021-08-30
Attending: PHYSICIAN ASSISTANT
Payer: MEDICARE

## 2021-08-30 ENCOUNTER — OFFICE VISIT (OUTPATIENT)
Dept: MEDICAL GROUP | Facility: CLINIC | Age: 66
End: 2021-08-30
Payer: MEDICARE

## 2021-08-30 VITALS
HEIGHT: 64 IN | OXYGEN SATURATION: 93 % | BODY MASS INDEX: 43.02 KG/M2 | SYSTOLIC BLOOD PRESSURE: 128 MMHG | DIASTOLIC BLOOD PRESSURE: 70 MMHG | WEIGHT: 252 LBS | HEART RATE: 78 BPM | RESPIRATION RATE: 16 BRPM | TEMPERATURE: 98.3 F

## 2021-08-30 DIAGNOSIS — K21.9 GERD WITHOUT ESOPHAGITIS: ICD-10-CM

## 2021-08-30 DIAGNOSIS — Z99.81 CHRONIC RESPIRATORY FAILURE WITH HYPOXIA, ON HOME OXYGEN THERAPY (HCC): ICD-10-CM

## 2021-08-30 DIAGNOSIS — J96.11 CHRONIC RESPIRATORY FAILURE WITH HYPOXIA, ON HOME OXYGEN THERAPY (HCC): ICD-10-CM

## 2021-08-30 DIAGNOSIS — J44.9 CHRONIC OBSTRUCTIVE PULMONARY DISEASE, UNSPECIFIED COPD TYPE (HCC): ICD-10-CM

## 2021-08-30 DIAGNOSIS — J06.9 VIRAL UPPER RESPIRATORY TRACT INFECTION: ICD-10-CM

## 2021-08-30 PROCEDURE — U0005 INFEC AGEN DETEC AMPLI PROBE: HCPCS

## 2021-08-30 PROCEDURE — U0003 INFECTIOUS AGENT DETECTION BY NUCLEIC ACID (DNA OR RNA); SEVERE ACUTE RESPIRATORY SYNDROME CORONAVIRUS 2 (SARS-COV-2) (CORONAVIRUS DISEASE [COVID-19]), AMPLIFIED PROBE TECHNIQUE, MAKING USE OF HIGH THROUGHPUT TECHNOLOGIES AS DESCRIBED BY CMS-2020-01-R: HCPCS

## 2021-08-30 PROCEDURE — 99214 OFFICE O/P EST MOD 30 MIN: CPT | Performed by: PHYSICIAN ASSISTANT

## 2021-08-30 RX ORDER — IPRATROPIUM BROMIDE AND ALBUTEROL SULFATE 2.5; .5 MG/3ML; MG/3ML
3 SOLUTION RESPIRATORY (INHALATION) EVERY 4 HOURS PRN
Qty: 300 ML | Refills: 3 | Status: SHIPPED | OUTPATIENT
Start: 2021-08-30 | End: 2024-02-21

## 2021-08-30 RX ORDER — PREDNISONE 10 MG/1
TABLET ORAL
Qty: 63 TABLET | Refills: 0 | Status: SHIPPED | OUTPATIENT
Start: 2021-08-30 | End: 2021-10-12

## 2021-08-30 RX ORDER — OMEPRAZOLE 20 MG/1
20 CAPSULE, DELAYED RELEASE ORAL DAILY
Qty: 30 CAPSULE | Refills: 2 | Status: SHIPPED | OUTPATIENT
Start: 2021-08-30 | End: 2023-11-15

## 2021-08-30 NOTE — PROGRESS NOTES
cc:  vomiting    Subjective:     Herlinda Gill is a 65 y.o. female presenting for vomiting      Patient presents to the office for vomiting and cough for three weeks. Patient states that she has an appointment with pulmonary medicine on the 7th.  Patient states that she was told to come in for her stomach.  Patient states that when she was younger, she was diagnosed with acid reflux.  She states that vomiting started a week ago.  She states that there was no change with the steroid she took prescribed by her PCP.  Patient states that she took doxycycline for one day.  She states that she has been vomiting since she took the doxycycline. She has been having epigastric abdominal pain.    She also acknowledges being exposed to Covid with her daughter but having 2 - Covid swabs since the exposure.    Review of systems:  See above.   Denies any symptoms unless previously indicated.        Current Outpatient Medications:   •  omeprazole (PRILOSEC) 20 MG delayed-release capsule, Take 1 Capsule by mouth every day., Disp: 30 Capsule, Rfl: 2  •  NON SPECIFIED, Nebulizer, Disp: 1 Each, Rfl: 0  •  ipratropium-albuterol (DUONEB) 0.5-2.5 (3) MG/3ML nebulizer solution, Take 3 mL by nebulization every four hours as needed for Shortness of Breath., Disp: 300 mL, Rfl: 3  •  predniSONE (DELTASONE) 10 MG Tab, Take 6 pills for 3 days then 5 pills for 3 days then 4 pills for 3 days then 3 pills for 3 days then 2 pills for 3 days then one pill for 3 days, Disp: 63 Tablet, Rfl: 0  •  COMBIVENT RESPIMAT  MCG/ACT Aero Soln, INHALE 1 PUFF BY MOUTH EVERY 6 HOURS, Disp: , Rfl:   •  DULoxetine (CYMBALTA) 20 MG Cap DR Particles, Take 20 mg by mouth 2 times a day., Disp: , Rfl:   •  trazodone (DESYREL) 300 MG tablet, Take 300 mg by mouth at bedtime., Disp: , Rfl:   •  Home Care Oxygen, 2 LPM continuous, Disp: , Rfl:   •  pantoprazole (PROTONIX) 20 MG tablet, Take 1 tablet by mouth every day., Disp: 90 tablet, Rfl: 0  •  metoprolol SR  "(TOPROL XL) 100 MG TABLET SR 24 HR, Take 100 mg by mouth every day., Disp: , Rfl:   •  Fluticasone-Umeclidin-Vilant (TRELEGY ELLIPTA) 100-62.5-25 MCG/INH AEROSOL POWDER, BREATH ACTIVATED, Inhale 1 Puff by mouth every day., Disp: 1 Each, Rfl: 11  •  albuterol (PROAIR HFA) 108 (90 Base) MCG/ACT Aero Soln inhalation aerosol, Inhale 2 Puffs by mouth every four hours as needed for Shortness of Breath., Disp: 1 Inhaler, Rfl: 11  •  lubiprostone (AMITIZA) 24 MCG capsule, Take 24 mcg by mouth 2 times a day, with meals., Disp: , Rfl:   •  atorvastatin (LIPITOR) 80 MG tablet, Take 80 mg by mouth every evening., Disp: , Rfl:   •  risperiDONE (RISPERDAL) 3 MG Tab, Take 3 mg by mouth every day., Disp: , Rfl:   •  carbamazepine (TEGRETOL) 200 MG Tab, Take 200 mg by mouth 3 times a day., Disp: , Rfl:   •  ezetimibe (ZETIA) 10 MG Tab, Take 10 mg by mouth every day., Disp: , Rfl:   •  ADVAIR DISKUS 500-50 MCG/DOSE AEROSOL POWDER, BREATH ACTIVATED, , Disp: , Rfl:   •  losartan (COZAAR) 100 MG Tab, Take 100 mg by mouth every day., Disp: , Rfl: 3    Allergies, past medical history, past surgical history, family history, social history reviewed and updated    Objective:     Vitals: /70 (BP Location: Left arm, Patient Position: Sitting, BP Cuff Size: Large adult)   Pulse 78   Temp 36.8 °C (98.3 °F) (Temporal)   Resp 16   Ht 1.626 m (5' 4\") Comment: obtained from chart  Wt 114 kg (252 lb) Comment: obtained from chart  LMP 09/24/2010   SpO2 93% Comment: on 3L O2  BMI 43.26 kg/m²   General: Alert, pleasant, NAD  EYES:   PERRL, EOMI, no icterus or pallor.  Conjunctivae and lids normal.   HENT:  Normocephalic.  External ears normal.  Neck supple.     Heart: Regular rate and rhythm.  Distant heart sounds  Respiratory: On O2 nasal canula.  Decreased to absent breath sounds all lung fields.    Abdomen: obese.    Skin: Warm, dry, no rashes.  Musculoskeletal: Gait is normal.  Moves all extremities well.    Extremities: normal range " of motion all extremities..   Neurological: No tremors, sensation grossly intact,  CN2-12 intact.  Psych:  Affect/mood is normal, judgement is good, memory is intact, grooming is appropriate.    Assessment/Plan:     Herlinda was seen today for gi problem and cough.    Diagnoses and all orders for this visit:    Chronic obstructive pulmonary disease, unspecified COPD type (HCC)  -     CBC WITH DIFFERENTIAL; Future  -     Comp Metabolic Panel; Future  -     DX-CHEST-2 VIEWS; Future  -     SARS-CoV-2 PCR (24 hour In-House): Collect NP swab in VTM; Future  -     NON SPECIFIED; Nebulizer  -     ipratropium-albuterol (DUONEB) 0.5-2.5 (3) MG/3ML nebulizer solution; Take 3 mL by nebulization every four hours as needed for Shortness of Breath.  -     predniSONE (DELTASONE) 10 MG Tab; Take 6 pills for 3 days then 5 pills for 3 days then 4 pills for 3 days then 3 pills for 3 days then 2 pills for 3 days then one pill for 3 days  Viral upper respiratory tract infection  -     CBC WITH DIFFERENTIAL; Future  -     Comp Metabolic Panel; Future  -     DX-CHEST-2 VIEWS; Future  -     SARS-CoV-2 PCR (24 hour In-House): Collect NP swab in VTM; Future  -     NON SPECIFIED; Nebulizer  -     ipratropium-albuterol (DUONEB) 0.5-2.5 (3) MG/3ML nebulizer solution; Take 3 mL by nebulization every four hours as needed for Shortness of Breath.  -     predniSONE (DELTASONE) 10 MG Tab; Take 6 pills for 3 days then 5 pills for 3 days then 4 pills for 3 days then 3 pills for 3 days then 2 pills for 3 days then one pill for 3 days  Chronic respiratory failure with hypoxia, on home oxygen therapy (HCC)  -     CBC WITH DIFFERENTIAL; Future  -     Comp Metabolic Panel; Future  -     DX-CHEST-2 VIEWS; Future  -     SARS-CoV-2 PCR (24 hour In-House): Collect NP swab in VTM; Future  -     NON SPECIFIED; Nebulizer  -     ipratropium-albuterol (DUONEB) 0.5-2.5 (3) MG/3ML nebulizer solution; Take 3 mL by nebulization every four hours as needed for Shortness of  Breath.  -     predniSONE (DELTASONE) 10 MG Tab; Take 6 pills for 3 days then 5 pills for 3 days then 4 pills for 3 days then 3 pills for 3 days then 2 pills for 3 days then one pill for 3 days    We will obtain labs to evaluate further as well as repeated chest x-ray.  We will also repeat a Covid swab.  We will order at nebulizer for patient as well as DuoNeb.  She understands not to  the DuoNeb until she receives the nebulizer.  Patient was on prednisone but I think she needs a larger and longer dose based on her symptoms.  Therefore we will start her on 60 mg to titrate down every 3 days.  We will have her follow-up with her primary care in 1 week.    GERD without esophagitis  -     omeprazole (PRILOSEC) 20 MG delayed-release capsule; Take 1 Capsule by mouth every day.    Believe the doxycycline most likely cause some acid reflux symptoms for patient.  We will provide her with omeprazole to see if we can help improve symptoms.  Again the plan will be to follow-up in a week.        Return in about 1 week (around 9/6/2021), or if symptoms worsen or fail to improve, for 1 week with Merissa Mendieta.    Please note that this dictation was created using voice recognition software. I have made every reasonable attempt to correct obvious errors, but expect that there are errors of grammar and possible content that I did not discover before finalizing note.

## 2021-08-31 DIAGNOSIS — J96.11 CHRONIC RESPIRATORY FAILURE WITH HYPOXIA, ON HOME OXYGEN THERAPY (HCC): ICD-10-CM

## 2021-08-31 DIAGNOSIS — Z99.81 CHRONIC RESPIRATORY FAILURE WITH HYPOXIA, ON HOME OXYGEN THERAPY (HCC): ICD-10-CM

## 2021-08-31 DIAGNOSIS — J44.9 CHRONIC OBSTRUCTIVE PULMONARY DISEASE, UNSPECIFIED COPD TYPE (HCC): ICD-10-CM

## 2021-08-31 DIAGNOSIS — J06.9 VIRAL UPPER RESPIRATORY TRACT INFECTION: ICD-10-CM

## 2021-08-31 LAB — COVID ORDER STATUS COVID19: NORMAL

## 2021-09-01 ENCOUNTER — TELEPHONE (OUTPATIENT)
Dept: MEDICAL GROUP | Facility: CLINIC | Age: 66
End: 2021-09-01

## 2021-09-01 DIAGNOSIS — R11.10 POST-TUSSIVE EMESIS: ICD-10-CM

## 2021-09-01 DIAGNOSIS — R11.0 NAUSEA: ICD-10-CM

## 2021-09-01 LAB
SARS-COV-2 RNA RESP QL NAA+PROBE: DETECTED
SPECIMEN SOURCE: ABNORMAL

## 2021-09-01 RX ORDER — ONDANSETRON 4 MG/1
4 TABLET, ORALLY DISINTEGRATING ORAL EVERY 6 HOURS PRN
Qty: 16 TABLET | Refills: 0 | Status: SHIPPED | OUTPATIENT
Start: 2021-09-01 | End: 2023-11-15

## 2021-09-01 NOTE — TELEPHONE ENCOUNTER
----- Message from Margaux Crews P.A.-C. sent at 9/1/2021  7:25 AM PDT -----  Please notify patient that she is positive for covid.  She needs to quarantine for 2 weeks.  If her symptoms are worsening, she needs to go to the ER.  Antibiotics will not help her symptoms.  I know she was having a hard time breathing.  IF this is still the case she needs to go in.

## 2021-09-01 NOTE — TELEPHONE ENCOUNTER
Phone Number Called: [phone] (home)       Call outcome: Spoke to patient regarding message below.    Message: Spoke with Patient and she understands results

## 2021-09-01 NOTE — PROGRESS NOTES
Patient has URI with COPD exacerbation. Patient having post tussive vomiting and nausea. Ondansetron 4 mg every 6 hours as needed.

## 2021-10-08 RX ORDER — LUBIPROSTONE 24 UG/1
24 CAPSULE ORAL 2 TIMES DAILY WITH MEALS
Qty: 60 CAPSULE | Refills: 0 | Status: SHIPPED | OUTPATIENT
Start: 2021-10-08

## 2021-10-12 ENCOUNTER — OFFICE VISIT (OUTPATIENT)
Dept: MEDICAL GROUP | Facility: CLINIC | Age: 66
End: 2021-10-12
Payer: MEDICARE

## 2021-10-12 VITALS
SYSTOLIC BLOOD PRESSURE: 128 MMHG | BODY MASS INDEX: 42.68 KG/M2 | HEIGHT: 64 IN | TEMPERATURE: 97.5 F | OXYGEN SATURATION: 98 % | WEIGHT: 250 LBS | DIASTOLIC BLOOD PRESSURE: 64 MMHG | HEART RATE: 67 BPM

## 2021-10-12 DIAGNOSIS — Z99.81 CHRONIC RESPIRATORY FAILURE WITH HYPOXIA, ON HOME OXYGEN THERAPY (HCC): ICD-10-CM

## 2021-10-12 DIAGNOSIS — M51.35 DDD (DEGENERATIVE DISC DISEASE), THORACOLUMBAR: ICD-10-CM

## 2021-10-12 DIAGNOSIS — M51.36 DDD (DEGENERATIVE DISC DISEASE), LUMBAR: ICD-10-CM

## 2021-10-12 DIAGNOSIS — J96.11 CHRONIC RESPIRATORY FAILURE WITH HYPOXIA, ON HOME OXYGEN THERAPY (HCC): ICD-10-CM

## 2021-10-12 DIAGNOSIS — J06.9 VIRAL UPPER RESPIRATORY TRACT INFECTION: ICD-10-CM

## 2021-10-12 PROBLEM — K21.9 GERD WITHOUT ESOPHAGITIS: Status: RESOLVED | Noted: 2021-02-23 | Resolved: 2021-10-12

## 2021-10-12 PROCEDURE — 99214 OFFICE O/P EST MOD 30 MIN: CPT | Performed by: PHYSICIAN ASSISTANT

## 2021-10-12 RX ORDER — METHOCARBAMOL 750 MG/1
1 TABLET, FILM COATED ORAL 3 TIMES DAILY
COMMUNITY
Start: 2021-10-07 | End: 2023-09-15

## 2021-10-12 RX ORDER — TRAZODONE HYDROCHLORIDE 300 MG/1
TABLET ORAL
COMMUNITY
End: 2021-10-12

## 2021-10-12 RX ORDER — RIVAROXABAN 20 MG/1
1 TABLET, FILM COATED ORAL DAILY
COMMUNITY
Start: 2021-09-12 | End: 2023-12-29

## 2021-10-12 RX ORDER — HYDROCHLOROTHIAZIDE 25 MG/1
25 TABLET ORAL DAILY
COMMUNITY
Start: 2021-09-03 | End: 2023-09-15

## 2021-10-12 RX ORDER — NEBULIZER AND COMPRESSOR
EACH MISCELLANEOUS
COMMUNITY
Start: 2021-08-31 | End: 2023-09-15

## 2021-10-12 RX ORDER — LEVOTHYROXINE SODIUM 0.07 MG/1
TABLET ORAL
COMMUNITY
End: 2023-09-15

## 2021-10-12 NOTE — PROGRESS NOTES
Subjective   Herlinda Gill is a 65 y.o. female who presents with Low Back Pain (Bilateral x30days) and Shoulder Pain (Bilateral X30days)    1. Viral upper respiratory tract infection  Patient has recently recovered from Covid.  She had a very difficult time breathing while she was ill, but she never developed pneumonia.  She has since recovered and feeling much better. She is due for routine labs that she was supposed to do before becoming ill. Will follow in one week with results.    2. Chronic respiratory failure with hypoxia, on home oxygen therapy (HCC)  Chronic condition.  Patient is on 2L O2 via nasal cannula continuous.  O2 sat in the office today 98%.  Had patient remove her oxygen and sit for 10 minutes her O2 saturation dropped to 94% she then completed a 2-minute walk without oxygen had her oxygen saturation dropped to 87%.  Patient will continue to use her oxygen as prescribed.    3. DDD (degenerative disc disease), lumbar  She sees a spine doctor who has started her on muscle relaxer prior to a upcoming procedure. She just started them yesterday and has noticed no relief so far. Have encouraged her to take the medication as prescribed. She is developing more pain in her low back. She has had multiple spine surgeries in the past and has been told that she would eventually need another.    4. DDD (degenerative disc disease), thoracolumbar  Orthopedic doctor has procedure scheduled to do injections in her mid back. She was started on muscle relaxer in preparation of this procedure. Her appointment is next week.    Past Medical History:  No date: Allergic rhinitis      Comment:  codeine, sulfa, depakote, seasonal  No date: Anemia  No date: Anxiety  No date: Arthritis  No date: Atrial fibrillation ([facility])      Comment:  intermittent  No date: Back pain  No date: Bipolar affective disorder (HCC)  No date: Breath shortness  No date: Bronchitis  No date: Cataract      Comment:  removed bilateral 2016  No date:  Chest tightness  No date: Chickenpox  No date: Constipation  No date: COPD (chronic obstructive pulmonary disease) ([facility])  No date: Daytime sleepiness  No date: Depression  No date: Diabetes ([facility])      Comment:  pre  10/25/2020: Dyspnea on exertion      Comment:  Last Assessment & Plan:  Formatting of this note might                be different from the original. Lexiscan MPI 2/20/2020:                EF 55% NMWA, elevated TID 1.45, SDS 3, NDI or MI. Patient               has had coronary angiography which reveals normal                coronaries.  False positive stress testing.  Holter                monitoring 11/27/19: NSR ave 75, min 54/max 120, no Afib,               rare PAC, no symptoms.  GDMT and risk factor                modification.  No date: GERD (gastroesophageal reflux disease)  No date: Hyperlipidemia  No date: Hypertension  12/31/2018: Hypoxia  No date: IBD (inflammatory bowel disease)      Comment:  constipation  No date: Indigestion  No date: Lumbar disc disease  No date: Menopause  No date: Migraine  No date: Morning headache  2/23/2021: Murmur  No date: Nasal drainage  No date: Obesity  No date: Pneumonia  4/26/2021: Primary osteoarthritis of right hip  No date: Pulmonary emphysema ([facility])  No date: Shortness of breath  No date: Sinusitis  No date: Sleep apnea      Comment:  CPAP and O2 at night  No date: Thyroid disease  No date: Wears glasses  No date: Wheezing  Past Surgical History:  05/2021: ARTHROPLASTY; Right  12/29/2011: CERVICAL DISK AND FUSION ANTERIOR      Comment:  Performed by MICKEY BOLIVAR at [facility]  No date: ABDOMINAL EXPLORATION  No date: EYE SURGERY      Comment:  Cataracts X2  No date: HYSTERECTOMY LAPAROSCOPY  No date: HYSTERECTOMY, TOTAL ABDOMINAL  No date: LAMINOTOMY  No date: LUMBAR FUSION POSTERIOR  No date: PB REMV 2ND CATARACT,CORN-SCLER SECTN  Social History    Tobacco Use      Smoking status: Former Smoker        Packs/day: 2.00        Years: 40.00         Pack years: 80        Types: Cigarettes        Quit date: 12/2017        Years since quitting: 3.8      Smokeless tobacco: Never Used      Tobacco comment: Continued abstinence    Vaping Use      Vaping Use: Never used    Alcohol use: No    Drug use: No    Review of patient's family history indicates:  Problem: Heart Disease      Relation: Mother          Age of Onset: (Not Specified)  Problem: Parkinson's Disease      Relation: Mother          Age of Onset: (Not Specified)  Problem: Asthma      Relation: Mother          Age of Onset: (Not Specified)  Problem: Hyperlipidemia      Relation: Mother          Age of Onset: (Not Specified)  Problem: Hypertension      Relation: Mother          Age of Onset: (Not Specified)  Problem: Alcohol abuse      Relation: Mother          Age of Onset: (Not Specified)  Problem: Cancer      Relation: Father          Age of Onset: (Not Specified)          Comment: lung  Problem: Alcohol abuse      Relation: Father          Age of Onset: (Not Specified)  Problem: GI Disease      Relation: Sister          Age of Onset: (Not Specified)          Comment: cirrhosis  Problem: Alcohol abuse      Relation: Sister          Age of Onset: (Not Specified)  Problem: Other      Relation: Brother          Age of Onset: (Not Specified)          Comment: Car accident  Problem: Lung Disease      Relation: Sister          Age of Onset: (Not Specified)          Comment: Emphysema  Problem: Alzheimer's Disease      Relation: Sister          Age of Onset: (Not Specified)  Problem: Cancer      Relation: Sister          Age of Onset: (Not Specified)          Comment: lung  Problem: Psychiatric Illness      Relation: Sister          Age of Onset: (Not Specified)      Current Outpatient Medications: •  levothyroxine (SYNTHROID) 75 MCG Tab, 1 tab(s), Disp: , Rfl: •  Nebulizers (PULMONEB LT) Misc, , Disp: , Rfl: •  hydroCHLOROthiazide (HYDRODIURIL) 25 MG Tab, Take 25 mg by mouth every day., Disp: , Rfl: •   methocarbamol (ROBAXIN) 750 MG Tab, Take 1 Tablet by mouth in the morning, at noon, and at bedtime., Disp: , Rfl: •  XARELTO 20 MG Tab tablet, Take 1 Tablet by mouth every day., Disp: , Rfl: •  lubiprostone (AMITIZA) 24 MCG capsule, Take 1 Capsule by mouth 2 times a day with meals., Disp: 60 Capsule, Rfl: 0•  ondansetron (ZOFRAN ODT) 4 MG TABLET DISPERSIBLE, Take 1 Tablet by mouth every 6 hours as needed for Nausea., Disp: 16 Tablet, Rfl: 0•  omeprazole (PRILOSEC) 20 MG delayed-release capsule, Take 1 Capsule by mouth every day., Disp: 30 Capsule, Rfl: 2•  NON SPECIFIED, Nebulizer, Disp: 1 Each, Rfl: 0•  ipratropium-albuterol (DUONEB) 0.5-2.5 (3) MG/3ML nebulizer solution, Take 3 mL by nebulization every four hours as needed for Shortness of Breath., Disp: 300 mL, Rfl: 3•  COMBIVENT RESPIMAT  MCG/ACT Aero Soln, INHALE 1 PUFF BY MOUTH EVERY 6 HOURS, Disp: , Rfl: •  ezetimibe (ZETIA) 10 MG Tab, Take 10 mg by mouth every day., Disp: , Rfl: •  DULoxetine (CYMBALTA) 20 MG Cap DR Particles, Take 20 mg by mouth 2 times a day., Disp: , Rfl: •  trazodone (DESYREL) 300 MG tablet, Take 300 mg by mouth at bedtime., Disp: , Rfl: •  Home Care Oxygen, 2 LPM continuous, Disp: , Rfl: •  metoprolol SR (TOPROL XL) 100 MG TABLET SR 24 HR, Take 100 mg by mouth every day., Disp: , Rfl: •  Fluticasone-Umeclidin-Vilant (TRELEGY ELLIPTA) 100-62.5-25 MCG/INH AEROSOL POWDER, BREATH ACTIVATED, Inhale 1 Puff by mouth every day., Disp: 1 Each, Rfl: 11•  albuterol (PROAIR HFA) 108 (90 Base) MCG/ACT Aero Soln inhalation aerosol, Inhale 2 Puffs by mouth every four hours as needed for Shortness of Breath., Disp: 1 Inhaler, Rfl: 11•  losartan (COZAAR) 100 MG Tab, Take 100 mg by mouth every day., Disp: , Rfl: 3 •  atorvastatin (LIPITOR) 80 MG tablet, Take 80 mg by mouth every evening., Disp: , Rfl: •  risperiDONE (RISPERDAL) 3 MG Tab, Take 3 mg by mouth every day., Disp: , Rfl:  •  carbamazepine (TEGRETOL) 200 MG Tab, Take 200 mg by mouth 3  "times a day., Disp: , Rfl:     Patient was instructed on the use of medications, either prescriptions or OTC and informed on when the appropriate follow up time period should be. In addition, patient was also instructed that should any acute worsening occur that they should notify this clinic asap or call 911.      Review of Systems   Constitutional: Negative.    HENT: Negative.    Eyes: Negative.    Respiratory: Positive for sputum production and shortness of breath. Negative for cough, hemoptysis and wheezing.    Cardiovascular: Negative.    Gastrointestinal: Negative.    Genitourinary: Negative.    Musculoskeletal: Positive for back pain. Negative for falls, joint pain, myalgias and neck pain.   Skin: Negative.    Neurological: Negative.    Endo/Heme/Allergies: Negative.    Psychiatric/Behavioral: Negative.      Objective     /64 (BP Location: Left arm, Patient Position: Sitting, BP Cuff Size: Large adult)   Pulse 67   Temp 36.4 °C (97.5 °F)   Ht 1.626 m (5' 4\")   Wt 113 kg (250 lb)   LMP 09/24/2010   SpO2 98%   BMI 42.91 kg/m²      Physical Exam  Vitals and nursing note reviewed.   Constitutional:       Appearance: Normal appearance. She is well-developed and well-groomed.   HENT:      Head: Normocephalic and atraumatic.      Nose: Nose normal.      Mouth/Throat:      Lips: Pink. No lesions.      Mouth: Mucous membranes are moist.   Eyes:      General: Lids are normal. Vision grossly intact. Gaze aligned appropriately.      Extraocular Movements: Extraocular movements intact.      Conjunctiva/sclera: Conjunctivae normal.      Pupils: Pupils are equal, round, and reactive to light.   Neck:      Thyroid: No thyromegaly.      Vascular: No carotid bruit or JVD.      Trachea: Trachea and phonation normal.   Cardiovascular:      Rate and Rhythm: Normal rate and regular rhythm.      Heart sounds: Normal heart sounds. No murmur heard.   No friction rub. No gallop.    Pulmonary:      Effort: Pulmonary effort " is normal.      Breath sounds: Examination of the right-upper field reveals decreased breath sounds. Examination of the left-upper field reveals decreased breath sounds. Examination of the left-middle field reveals decreased breath sounds. Examination of the right-lower field reveals decreased breath sounds. Examination of the left-lower field reveals decreased breath sounds. Decreased breath sounds present. No wheezing, rhonchi or rales.   Musculoskeletal:         General: Normal range of motion.      Cervical back: Normal range of motion and neck supple.      Right lower leg: No edema.      Left lower leg: No edema.   Lymphadenopathy:      Cervical: No cervical adenopathy.   Skin:     General: Skin is warm and dry.      Capillary Refill: Capillary refill takes less than 2 seconds.      Findings: No lesion or rash.   Neurological:      Mental Status: She is alert and oriented to person, place, and time.      Cranial Nerves: Cranial nerves are intact.   Psychiatric:         Attention and Perception: Attention and perception normal.         Mood and Affect: Mood and affect normal.         Speech: Speech normal.         Behavior: Behavior normal. Behavior is cooperative.         Thought Content: Thought content normal.         Judgment: Judgment normal.       Assessment & Plan      1. Viral upper respiratory tract infection    2. Chronic respiratory failure with hypoxia, on home oxygen therapy (HCC)    3. DDD (degenerative disc disease), lumbar    4. DDD (degenerative disc disease), thoracolumbar

## 2021-10-13 ASSESSMENT — ENCOUNTER SYMPTOMS
NECK PAIN: 0
FALLS: 0
MYALGIAS: 0
PSYCHIATRIC NEGATIVE: 1
HEMOPTYSIS: 0
BACK PAIN: 1
NEUROLOGICAL NEGATIVE: 1
CONSTITUTIONAL NEGATIVE: 1
EYES NEGATIVE: 1
WHEEZING: 0
COUGH: 0
GASTROINTESTINAL NEGATIVE: 1
SHORTNESS OF BREATH: 1
CARDIOVASCULAR NEGATIVE: 1
SPUTUM PRODUCTION: 1

## 2021-10-13 ASSESSMENT — VISUAL ACUITY: OU: 1

## 2023-07-31 ENCOUNTER — OFFICE VISIT (OUTPATIENT)
Dept: URGENT CARE | Facility: PHYSICIAN GROUP | Age: 68
End: 2023-07-31
Payer: MEDICARE

## 2023-07-31 VITALS
RESPIRATION RATE: 15 BRPM | OXYGEN SATURATION: 99 % | WEIGHT: 240 LBS | BODY MASS INDEX: 40.97 KG/M2 | TEMPERATURE: 97.5 F | HEIGHT: 64 IN | HEART RATE: 67 BPM

## 2023-07-31 DIAGNOSIS — Z00.00 HEALTH CARE MAINTENANCE: ICD-10-CM

## 2023-07-31 DIAGNOSIS — J01.90 ACUTE BACTERIAL SINUSITIS: Primary | ICD-10-CM

## 2023-07-31 DIAGNOSIS — B96.89 ACUTE BACTERIAL SINUSITIS: Primary | ICD-10-CM

## 2023-07-31 PROBLEM — G43.119 INTRACTABLE MIGRAINE WITH AURA WITHOUT STATUS MIGRAINOSUS: Status: ACTIVE | Noted: 2022-05-16

## 2023-07-31 PROBLEM — E88.810 METABOLIC SYNDROME: Status: ACTIVE | Noted: 2022-11-15

## 2023-07-31 PROBLEM — R41.3 MEMORY DEFICIT: Status: ACTIVE | Noted: 2023-07-10

## 2023-07-31 PROBLEM — M81.0 AGE-RELATED OSTEOPOROSIS WITHOUT CURRENT PATHOLOGICAL FRACTURE: Status: ACTIVE | Noted: 2022-03-02

## 2023-07-31 PROBLEM — M25.552 PAIN OF LEFT HIP: Status: ACTIVE | Noted: 2022-06-27

## 2023-07-31 PROBLEM — K59.03 DRUG-INDUCED CONSTIPATION: Status: ACTIVE | Noted: 2023-05-11

## 2023-07-31 PROBLEM — M25.462 SWELLING OF JOINT OF LEFT KNEE: Status: ACTIVE | Noted: 2023-05-11

## 2023-07-31 PROBLEM — E78.5 DYSLIPIDEMIA: Status: ACTIVE | Noted: 2020-10-25

## 2023-07-31 PROCEDURE — 99213 OFFICE O/P EST LOW 20 MIN: CPT | Performed by: NURSE PRACTITIONER

## 2023-07-31 RX ORDER — MELOXICAM 15 MG/1
15 TABLET ORAL
COMMUNITY
Start: 2023-07-10 | End: 2023-09-15

## 2023-07-31 RX ORDER — FLUTICASONE PROPIONATE 50 MCG
1 SPRAY, SUSPENSION (ML) NASAL DAILY
Qty: 16 G | Refills: 0 | Status: SHIPPED | OUTPATIENT
Start: 2023-07-31 | End: 2023-08-10

## 2023-07-31 RX ORDER — NITROFURANTOIN 25; 75 MG/1; MG/1
CAPSULE ORAL
COMMUNITY
Start: 2023-07-11 | End: 2023-07-31

## 2023-07-31 RX ORDER — DOXYCYCLINE HYCLATE 100 MG
100 TABLET ORAL 2 TIMES DAILY
Qty: 14 TABLET | Refills: 0 | Status: SHIPPED | OUTPATIENT
Start: 2023-07-31 | End: 2023-08-07

## 2023-07-31 RX ORDER — ATORVASTATIN CALCIUM 80 MG/1
80 TABLET, FILM COATED ORAL
COMMUNITY
Start: 2023-07-10 | End: 2023-09-15

## 2023-07-31 RX ORDER — PREDNISONE 20 MG/1
40 TABLET ORAL DAILY
Qty: 10 TABLET | Refills: 0 | Status: SHIPPED | OUTPATIENT
Start: 2023-07-31 | End: 2023-08-05

## 2023-07-31 RX ORDER — METHOCARBAMOL 750 MG/1
750 TABLET, FILM COATED ORAL
COMMUNITY
Start: 2023-07-10 | End: 2023-09-15

## 2023-07-31 ASSESSMENT — FIBROSIS 4 INDEX: FIB4 SCORE: 0.89

## 2023-07-31 NOTE — PROGRESS NOTES
Subjective:     Herlinda Gill is a 67 y.o. female who presents for Sinus Problem (Poss inf x6 days)      Sinus Problem      Pt presents for evaluation of a new problem. Herlinda is a very pleasant 67-year-old female presents urgent care today with complaints of sinusitis x6 days.  She complains of sinus tenderness, congestion, mucus drainage and postnasal drip.  She denies fever, chills, nausea/vomiting, ear pain or cough.  She does suffer from COPD and is on 3 L of supplemental oxygen continuously.  She has been using Claritin for her symptoms.  Negative for any known ill contacts.  Her symptoms are progressively worsening.  She does endorse an upset stomach due to mucus drainage.    ROS  Please see HPI  PMH:   Past Medical History:   Diagnosis Date    Allergic rhinitis     codeine, sulfa, depakote, seasonal    Anemia     Anxiety     Arthritis     Atrial fibrillation (HCC)     intermittent    Back pain     Bipolar affective disorder (HCC)     Breath shortness     Bronchitis     Cataract     removed bilateral 2016    Chest tightness     Chickenpox     Constipation     COPD (chronic obstructive pulmonary disease) (HCC)     Daytime sleepiness     Depression     Diabetes (HCC)     pre    Dyspnea on exertion 10/25/2020    Last Assessment & Plan:  Formatting of this note might be different from the original. Lexiscan MPI 2/20/2020: EF 55% NMWA, elevated TID 1.45, SDS 3, NDI or MI. Patient has had coronary angiography which reveals normal coronaries.  False positive stress testing.  Holter monitoring 11/27/19: NSR ave 75, min 54/max 120, no Afib, rare PAC, no symptoms.  GDMT and risk factor modification.    GERD (gastroesophageal reflux disease)     Hyperlipidemia     Hypertension     Hypoxia 12/31/2018    IBD (inflammatory bowel disease)     constipation    Indigestion     Lumbar disc disease     Menopause     Migraine     Morning headache     Murmur 2/23/2021    Nasal drainage     Obesity     Pneumonia     Primary  osteoarthritis of right hip 2021    Pulmonary emphysema (HCC)     Shortness of breath     Sinusitis     Sleep apnea     CPAP and O2 at night    Thyroid disease     Wears glasses     Wheezing      ALLERGIES:   Allergies   Allergen Reactions    Codeine Vomiting and Rash     Vomiting and Rash  Vomiting and Rash  Feels sick  Other reaction(s): .Unknown, Other (See Comments)  Vomiting and Rash  Feels sick  Vomiting and Rash  Vomiting and Rash  Feels sick    Divalproex Sodium Myalgia     Other reaction(s): Unknown    Sulfa Drugs Rash and Anaphylaxis     Rash and vomiting  Other reaction(s): Anaphylaxis  Rash and vomiting    Rash and vomiting    Rash and vomiting  Other reaction(s): Anaphylaxis  Rash and vomiting    Valproic Acid Unspecified     Other reaction(s): Fainting  Increases ammonia level, causes the patient to pass out.  Other reaction(s): .Unknown, Fainting, Other (See Comments), Unknown  Increases ammonia level, causes the patient to pass out.    Other reaction(s): Fainting  Increases ammonia level, causes the patient to pass out.       SURGHX:   Past Surgical History:   Procedure Laterality Date    ARTHROPLASTY Right 2021    CERVICAL DISK AND FUSION ANTERIOR  2011    Performed by MICKEY BOLIVAR at SURGERY [facility] ORS    ABDOMINAL EXPLORATION      EYE SURGERY      Cataracts X2    FUSION, SPINE, LUMBAR, PLIF      HYSTERECTOMY LAPAROSCOPY      HYSTERECTOMY, TOTAL ABDOMINAL      LAMINOTOMY      CT REMV 2ND CATARACT,CORN-SCLER SECTN       SOCHX:   Social History     Socioeconomic History    Marital status:     Number of children: 3    Highest education level: 7th grade   Occupational History    Occupation: Retired   Tobacco Use    Smoking status: Former     Packs/day: 2.00     Years: 40.00     Pack years: 80.00     Types: Cigarettes     Quit date: 2017     Years since quittin.6     Passive exposure: Never    Smokeless tobacco: Never    Tobacco comments:     Continued abstinence  "  Vaping Use    Vaping Use: Never used   Substance and Sexual Activity    Alcohol use: No    Drug use: No    Sexual activity: Yes     Partners: Male     Birth control/protection: Post-Menopausal     FH:   Family History   Problem Relation Age of Onset    Heart Disease Mother     Parkinson's Disease Mother     Asthma Mother     Hyperlipidemia Mother     Hypertension Mother     Alcohol abuse Mother     Cancer Father         lung    Alcohol abuse Father     GI Disease Sister         cirrhosis    Alcohol abuse Sister     Other Brother         Car accident    Lung Disease Sister         Emphysema    Alzheimer's Disease Sister     Cancer Sister         lung    Psychiatric Illness Sister          Objective:   Pulse 67   Temp 36.4 °C (97.5 °F) (Temporal)   Resp 15   Ht 1.626 m (5' 4\")   Wt 109 kg (240 lb)   LMP 09/24/2010   SpO2 99%   BMI 41.20 kg/m²     Physical Exam  Vitals and nursing note reviewed.   Constitutional:       General: She is not in acute distress.     Appearance: Normal appearance. She is not ill-appearing.   HENT:      Head: Normocephalic and atraumatic.      Salivary Glands: Right salivary gland is not tender.      Right Ear: External ear normal.      Left Ear: External ear normal.      Nose: No congestion or rhinorrhea.      Right Turbinates: Enlarged and swollen.      Left Turbinates: Enlarged and swollen.      Right Sinus: Maxillary sinus tenderness and frontal sinus tenderness present.      Left Sinus: Maxillary sinus tenderness and frontal sinus tenderness present.      Mouth/Throat:      Mouth: Mucous membranes are moist.   Eyes:      Extraocular Movements: Extraocular movements intact.      Pupils: Pupils are equal, round, and reactive to light.   Cardiovascular:      Rate and Rhythm: Normal rate and regular rhythm.      Pulses: Normal pulses.      Heart sounds: Normal heart sounds.   Pulmonary:      Effort: Pulmonary effort is normal.      Breath sounds: Normal breath sounds.   Abdominal: "      General: Abdomen is flat. Bowel sounds are normal.      Palpations: Abdomen is soft.      Tenderness: There is abdominal tenderness. There is no right CVA tenderness or left CVA tenderness.   Musculoskeletal:         General: Normal range of motion.      Cervical back: Normal range of motion and neck supple.   Skin:     General: Skin is warm and dry.      Capillary Refill: Capillary refill takes less than 2 seconds.   Neurological:      General: No focal deficit present.      Mental Status: She is alert and oriented to person, place, and time. Mental status is at baseline.   Psychiatric:         Mood and Affect: Mood normal.         Behavior: Behavior normal.         Thought Content: Thought content normal.         Judgment: Judgment normal.         Assessment/Plan:   Assessment    1. Acute bacterial sinusitis  doxycycline (VIBRAMYCIN) 100 MG Tab    predniSONE (DELTASONE) 20 MG Tab    fluticasone (FLONASE) 50 MCG/ACT nasal spray      2. Health care maintenance  Referral to establish with [facility]        Patient to start prednisone and Flonase.  Also encouraged use of warm facial compresses and humidifier.  If symptoms do not improve or worsen in the next 3 days she may start doxycycline antibiotic.  Patient to follow-up for worsening or persistent symptoms.  Questions/concerns addressed.  She is in agreement with her plan of care today.

## 2023-09-15 ENCOUNTER — OFFICE VISIT (OUTPATIENT)
Dept: MEDICAL GROUP | Facility: CLINIC | Age: 68
End: 2023-09-15
Payer: MEDICARE

## 2023-09-15 VITALS
HEART RATE: 68 BPM | BODY MASS INDEX: 40.63 KG/M2 | TEMPERATURE: 96.5 F | WEIGHT: 238 LBS | RESPIRATION RATE: 16 BRPM | SYSTOLIC BLOOD PRESSURE: 137 MMHG | DIASTOLIC BLOOD PRESSURE: 68 MMHG | OXYGEN SATURATION: 97 % | HEIGHT: 64 IN

## 2023-09-15 DIAGNOSIS — R73.03 PREDIABETES: ICD-10-CM

## 2023-09-15 DIAGNOSIS — R51.9 NONINTRACTABLE EPISODIC HEADACHE, UNSPECIFIED HEADACHE TYPE: ICD-10-CM

## 2023-09-15 DIAGNOSIS — J44.9 CHRONIC OBSTRUCTIVE PULMONARY DISEASE, UNSPECIFIED COPD TYPE (HCC): ICD-10-CM

## 2023-09-15 DIAGNOSIS — G47.30 SLEEP APNEA, UNSPECIFIED TYPE: ICD-10-CM

## 2023-09-15 DIAGNOSIS — I48.0 PAROXYSMAL ATRIAL FIBRILLATION (HCC): ICD-10-CM

## 2023-09-15 PROCEDURE — 99214 OFFICE O/P EST MOD 30 MIN: CPT | Mod: GC

## 2023-09-15 PROCEDURE — 3075F SYST BP GE 130 - 139MM HG: CPT

## 2023-09-15 PROCEDURE — 3078F DIAST BP <80 MM HG: CPT

## 2023-09-15 RX ORDER — RISPERIDONE 1 MG/1
1 TABLET ORAL NIGHTLY
COMMUNITY
End: 2023-12-29

## 2023-09-15 RX ORDER — DULOXETIN HYDROCHLORIDE 20 MG/1
20 CAPSULE, DELAYED RELEASE ORAL 2 TIMES DAILY
COMMUNITY
End: 2023-12-29

## 2023-09-15 RX ORDER — CARBAMAZEPINE 200 MG/1
200 TABLET ORAL DAILY
COMMUNITY
End: 2024-02-21

## 2023-09-15 RX ORDER — FLUTICASONE PROPIONATE 50 MCG
1 SPRAY, SUSPENSION (ML) NASAL DAILY
Qty: 16 G | Refills: 3 | Status: SHIPPED | OUTPATIENT
Start: 2023-09-15 | End: 2023-12-29

## 2023-09-15 RX ORDER — CETIRIZINE HYDROCHLORIDE 10 MG/1
10 TABLET ORAL DAILY
Qty: 90 TABLET | Refills: 3 | Status: SHIPPED | OUTPATIENT
Start: 2023-09-15 | End: 2023-12-29

## 2023-09-15 ASSESSMENT — FIBROSIS 4 INDEX: FIB4 SCORE: 1.16

## 2023-09-15 NOTE — ASSESSMENT & PLAN NOTE
Likely sinus related based on history. Low suspicion for migraine (pt denies aura, light/sound sensitivity). Seems associated with sinus/allergy symptoms. Occurring more frequently  - switch claratin to zyrtec  - start flonase  - neti-pot or other rinse  - f/u in 1 month so assess response to treatement  - avoid nsaids (pt is anticoagulated)

## 2023-09-15 NOTE — PROGRESS NOTES
This note is formatted in an APSO format, for additional subjective and objective evaluation please scroll to the bottom of the note.    CC:  Chief Complaint   Patient presents with    Establish Care    Migraine     Pt was seeing Dr. Amber Austin, but [place] out of [place].  Pt sees cardiology, Dr. Browne in [place] ([facility]).  Pt sees pulmonology, Dr. Rahman with ramone verdin.  Pt sees Henri MCALLISTER from [facility] for back pain.    Pt has afib and is anticoagulated with xarelto.    Pt quit smoking 10-30 years ago. She is supposed to use a CPAP     Assessment/Plan:  Problem List Items Addressed This Visit       Chronic obstructive pulmonary disease (HCC)     Followed by pulm, Dr. Rahman with ramone verdin. On 3 L today. O2Sat 97%         Relevant Medications    cetirizine (ZYRTEC) 10 MG Tab    fluticasone (FLONASE) 50 MCG/ACT nasal spray    Paroxysmal atrial fibrillation (HCC)    Prediabetes    Headache     Likely sinus related based on history. Low suspicion for migraine (pt denies aura, light/sound sensitivity). Seems associated with sinus/allergy symptoms. Occurring more frequently  - switch claratin to zyrtec  - start flonase  - neti-pot or other rinse  - f/u in 1 month so assess response to treatement  - avoid nsaids (pt is anticoagulated)         Sleep apnea      Orders Placed This Encounter    DULoxetine (CYMBALTA) 20 MG Cap DR Particles    risperiDONE (RISPERDAL) 1 MG Tab    carBAMazepine (TEGRETOL) 200 MG Tab    cetirizine (ZYRTEC) 10 MG Tab    fluticasone (FLONASE) 50 MCG/ACT nasal spray     Return in about 4 weeks (around 10/13/2023), or next available.    LABS: Results reviewed and discussed with the patient, questions answered.    HISTORY OF PRESENT ILLNESS: Patient is a 67 y.o. female established patient who presents today with:    Problem   Headache    Pt reports having migraines for years. They seem to be getting worse. They used be spaced out, but now every day starting 6-7  "months ago. Pt is not aware of any significant changes around that time. Headache starts in the forehead bilat and moves back. It is aching and pounding. Lasts 2-5 hours. Not light or sound sensitive. Pt reports frequent sinus infection.  She has tried tylenol, asa, ibuprofen (she is on a blood thinner so she tries not to take nsaids). Tylenol and ibu help once in a while.    She reports that when her sinuses start bothering her \"I can bet I'm going to get a migraine.\" Antibiotics help make the headache go away for a while but then they come back.    She takes claratin for allergies, she takes it every day.      Sleep Apnea    CPAP and O2 at night. Pt noncompliant with CPAP but intends to start using.     Prediabetes    7/11/23 A1c 6.2     Paroxysmal Atrial Fibrillation (Hcc)    - Managed by cardiology, Dr. Browne in [place] ([facility]).  - anticoagulated on Xarelto.     Chronic Obstructive Pulmonary Disease (Hcc)    Followed by pulmDr. Rahman with [facility]. On 3 L O2 NC    Jamil 9/24/10: FEV1/FVC 0.74, FEV1 66%    Formatting of this note might be different from the original.  Jamil 9/24/10: FEV1/FVC 0.74, FEV1 66%         1. Nonintractable episodic headache, unspecified headache type        2. Paroxysmal atrial fibrillation (HCC)        3. Prediabetes        4. Sleep apnea, unspecified type        5. Chronic obstructive pulmonary disease, unspecified COPD type (HCC)            Patient Active Problem List    Diagnosis Date Noted    Headache     Sleep apnea     Memory deficit 07/10/2023    Swelling of joint of left knee 05/11/2023    Drug-induced constipation 05/11/2023    Metabolic syndrome 11/15/2022    Pain of left hip 06/27/2022    Intractable migraine with aura without status migrainosus 05/16/2022    Age-related osteoporosis without current pathological fracture 03/02/2022    Gastroesophageal reflux disease without esophagitis 08/30/2021    Viral upper respiratory tract infection 08/24/2021 "    Simple chronic bronchitis (HCC) 07/01/2021    Chronic hyponatremia 07/01/2021    History of right hip replacement 05/18/2021    Arthritis 02/23/2021    Bipolar affective disorder, currently depressed, mild (HCC) 02/23/2021    DDD (degenerative disc disease), thoracolumbar 02/23/2021    Dysphagia 02/23/2021    Episodic tension-type headache, not intractable 02/23/2021    Hepatic steatosis 02/23/2021    Long term (current) use of anticoagulants 02/23/2021    Murmur 02/23/2021    Prediabetes 02/23/2021    Solitary pulmonary nodule 02/23/2021    Obstructive sleep apnea of adult 10/25/2020    Paroxysmal atrial fibrillation (HCC) 10/25/2020    Dyslipidemia 10/25/2020    BMI 45.0-49.9, adult ([facility]) 12/31/2018    Chronic respiratory failure with hypoxia, on home oxygen therapy ([facility]) 02/01/2018    History of fusion of cervical spine 12/29/2011    Essential hypertension 05/19/2011    Acquired hypothyroidism 05/19/2011    Elevated liver enzymes 05/19/2011    Chronic obstructive pulmonary disease (HCC) 09/25/2010    Cigarette nicotine dependence in remission 09/24/2010    Bipolar 2 disorder (HCC) 09/24/2010    DDD (degenerative disc disease), lumbar 09/24/2010    Mixed hyperlipidemia 09/24/2010      Allergies:Codeine, Divalproex sodium, Sulfa drugs, and Valproic acid    Current Outpatient Medications   Medication Sig Dispense Refill    DULoxetine (CYMBALTA) 20 MG Cap DR Particles Take 20 mg by mouth 2 times a day.      risperiDONE (RISPERDAL) 1 MG Tab Take 1 mg by mouth every evening.      carBAMazepine (TEGRETOL) 200 MG Tab Take 200 mg by mouth every day. Take 3 tablets PO once daily at bedtime.      cetirizine (ZYRTEC) 10 MG Tab Take 1 Tablet by mouth every day. 90 Tablet 3    fluticasone (FLONASE) 50 MCG/ACT nasal spray Administer 1 Spray into affected nostril(S) every day. Can increase to 1 spray into both nostrils twice daily if needed to resolve symptoms. 16 g 3    Home Care Oxygen 3 lpm      atorvastatin (LIPITOR) 80  MG tablet Take 1 Tablet by mouth at bedtime.      TRULICITY 1.5 MG/0.5ML Solution Pen-injector INJECT 1 SYRINGE SUBCUTANEOUSLY ONCE WEEKLY      metoprolol (TOPROL-XL) 200 MG XL tablet Take 1 Tablet by mouth every day.      XARELTO 20 MG Tab tablet Take 1 Tablet by mouth every day.      lubiprostone (AMITIZA) 24 MCG capsule Take 1 Capsule by mouth 2 times a day with meals. 60 Capsule 0    GAVILYTE-G 236 g Recon Soln TAKE 240 ML (8 OUNCES) BY MOUTH EVERY 10 MINUTES UNTIL 4 LITERS ARE CONSUMED OR RECTAL EFFLUENT IS CLEAR. RAPID DRINKING OF EACH PORTION IS PREFERRED TO DRINKING SMALL AMOUNTS CONTINUOUSLY.      ipratropium-albuterol (COMBIVENT RESPIMAT)  MCG/ACT Aero Soln 1 puff(s)      ondansetron (ZOFRAN ODT) 4 MG TABLET DISPERSIBLE Take 1 Tablet by mouth every 6 hours as needed for Nausea. 16 Tablet 0    omeprazole (PRILOSEC) 20 MG delayed-release capsule Take 1 Capsule by mouth every day. 30 Capsule 2    NON SPECIFIED Nebulizer 1 Each 0    ipratropium-albuterol (DUONEB) 0.5-2.5 (3) MG/3ML nebulizer solution Take 3 mL by nebulization every four hours as needed for Shortness of Breath. 300 mL 3    COMBIVENT RESPIMAT  MCG/ACT Aero Soln INHALE 1 PUFF BY MOUTH EVERY 6 HOURS      Fluticasone-Umeclidin-Vilant (TRELEGY ELLIPTA) 100-62.5-25 MCG/INH AEROSOL POWDER, BREATH ACTIVATED Inhale 1 Puff by mouth every day. 1 Each 11    albuterol (PROAIR HFA) 108 (90 Base) MCG/ACT Aero Soln inhalation aerosol Inhale 2 Puffs by mouth every four hours as needed for Shortness of Breath. 1 Inhaler 11     No current facility-administered medications for this visit.       Social History     Tobacco Use    Smoking status: Former     Current packs/day: 0.00     Average packs/day: 2.0 packs/day for 40.0 years (80.0 ttl pk-yrs)     Types: Cigarettes     Start date: 1977     Quit date: 2017     Years since quittin.7     Passive exposure: Never    Smokeless tobacco: Never    Tobacco comments:     Continued abstinence   Vaping  "Use    Vaping Use: Never used   Substance Use Topics    Alcohol use: No    Drug use: No     Social History     Social History Narrative    Not on file       Family History   Problem Relation Age of Onset    Heart Disease Mother     Parkinson's Disease Mother     Asthma Mother     Hyperlipidemia Mother     Hypertension Mother     Alcohol abuse Mother     Cancer Father         lung    Alcohol abuse Father     GI Disease Sister         cirrhosis    Alcohol abuse Sister     Other Brother         Car accident    Lung Disease Sister         Emphysema    Alzheimer's Disease Sister     Cancer Sister         lung    Psychiatric Illness Sister        Exam:    /68 (BP Location: Left arm, Patient Position: Sitting, BP Cuff Size: Adult)   Pulse 68   Temp 35.8 °C (96.5 °F) (Temporal)   Resp 16   Ht 1.626 m (5' 4\")   Wt 108 kg (238 lb)   LMP 09/24/2010   SpO2 97% Comment: On 3L of oxygen  BMI 40.85 kg/m²  Body mass index is 40.85 kg/m².    Gen: Alert and oriented, No apparent distress. Well developed. On 3L O2 NC. Comfortable in chair. Ambulates independently.  HEENT: NCAT, MMM  Neck: Supple, FROM  Chest: No deformities, Equal chest expansion  Lungs: Normal effort, CTA bilaterally, no wheezes, rhonchi, or rales  CV: Regular rate and rhythm. No murmurs, rubs, or gallops. Pulse palpable  Abd: Non-distended  Ext: No clubbing, cyanosis, edema.  Skin: Warm/dry, without rashes  Neuro: A/O x 4, CN 2-12 Grossly intact, Motor/sensory grossly intact  Psych: Normal behavior, normal affect      Tha Rossi MD  [facility] Resident    "

## 2023-10-24 ENCOUNTER — OFFICE VISIT (OUTPATIENT)
Dept: MEDICAL GROUP | Facility: CLINIC | Age: 68
End: 2023-10-24
Payer: MEDICARE

## 2023-10-24 VITALS
WEIGHT: 242 LBS | HEIGHT: 64 IN | HEART RATE: 66 BPM | BODY MASS INDEX: 41.32 KG/M2 | DIASTOLIC BLOOD PRESSURE: 86 MMHG | TEMPERATURE: 97.6 F | RESPIRATION RATE: 16 BRPM | SYSTOLIC BLOOD PRESSURE: 177 MMHG | OXYGEN SATURATION: 95 %

## 2023-10-24 DIAGNOSIS — I10 ESSENTIAL HYPERTENSION: ICD-10-CM

## 2023-10-24 DIAGNOSIS — M54.2 NECK PAIN: ICD-10-CM

## 2023-10-24 DIAGNOSIS — G44.209 TENSION HEADACHE: ICD-10-CM

## 2023-10-24 PROCEDURE — 99214 OFFICE O/P EST MOD 30 MIN: CPT | Mod: GC

## 2023-10-24 PROCEDURE — 3077F SYST BP >= 140 MM HG: CPT

## 2023-10-24 PROCEDURE — 3079F DIAST BP 80-89 MM HG: CPT

## 2023-10-24 RX ORDER — VENLAFAXINE HYDROCHLORIDE 37.5 MG/1
37.5 CAPSULE, EXTENDED RELEASE ORAL DAILY
Qty: 90 CAPSULE | Refills: 1 | Status: SHIPPED | OUTPATIENT
Start: 2023-10-24 | End: 2023-10-24

## 2023-10-24 ASSESSMENT — FIBROSIS 4 INDEX: FIB4 SCORE: 1.16

## 2023-10-24 NOTE — ASSESSMENT & PLAN NOTE
Patient's headaches are most likely tension type based on history and lack of response to sinus treatments.  Physical exam significant for mild tenderness to palpation of muscles of neck and occipital region of head bilaterally.  Patient reports she had already tried amitriptyline which did not help.  She is currently on Cymbalta.  She takes Tylenol 3 g daily which only helps a little.  -Discussed meditation, massage  -Referral to physical therapy for neck pain  -Follow-up in 1 month

## 2023-10-24 NOTE — PROGRESS NOTES
This note is formatted in an APSO format, for additional subjective and objective evaluation please scroll to the bottom of the note.    CC:  Chief Complaint   Patient presents with    Headache     Takes geodon and carbamazapime for depression. Not taking risperidone.     Psych Berta Antunez: [facility].    Follow-up in 1 month to discuss headache, blood pressure, problem list.    Assessment/Plan:  Problem List Items Addressed This Visit       Essential hypertension     History of essential hypertension on chart, BP today is 177/86, previous appointment a month ago blood pressure was in the 130s.  They do have a blood pressure cuff at home.  -Patient's  agrees to check her blood pressure and bring in a log  -CMP recently checked, we will check TSH and UA.  -Consider starting ARB or ACE I at next appointment if BP is elevated.         Relevant Orders    TSH WITH REFLEX TO FT4    URINALYSIS    Tension headache     Patient's headaches are most likely tension type based on history and lack of response to sinus treatments.  Physical exam significant for mild tenderness to palpation of muscles of neck and occipital region of head bilaterally.  Patient reports she had already tried amitriptyline which did not help.  She is currently on Cymbalta.  She takes Tylenol 3 g daily which only helps a little.  -Discussed meditation, massage  -Referral to physical therapy for neck pain  -Follow-up in 1 month         Relevant Orders    Referral to Massage Therapy    Referral to Physical Therapy    Neck pain    Relevant Orders    Referral to Massage Therapy    Referral to Physical Therapy      Orders Placed This Encounter    TSH WITH REFLEX TO FT4    URINALYSIS    Referral to Massage Therapy    Referral to Physical Therapy    DISCONTD: venlafaxine XR (EFFEXOR XR) 37.5 MG CAPSULE SR 24 HR       Return in about 4 weeks (around 11/21/2023).      LABS: Results reviewed and discussed with the patient, questions  answered.    HISTORY OF PRESENT ILLNESS: Patient is a 67 y.o. female established patient who presents today with:    Problem   Tension Headache    Patient with headache for years, starts in the back of the head and moves towards the top.  She reports significant tension in neck and shoulders.     Neck Pain   Essential Hypertension       1. Tension headache  Referral to Massage Therapy    Referral to Physical Therapy      2. Neck pain  Referral to Massage Therapy    Referral to Physical Therapy      3. Essential hypertension  TSH WITH REFLEX TO FT4    URINALYSIS          Patient Active Problem List    Diagnosis Date Noted    Tension headache 10/24/2023    Neck pain 10/24/2023    Headache     Sleep apnea     Memory deficit 07/10/2023    Swelling of joint of left knee 05/11/2023    Drug-induced constipation 05/11/2023    Metabolic syndrome 11/15/2022    Pain of left hip 06/27/2022    Intractable migraine with aura without status migrainosus 05/16/2022    Age-related osteoporosis without current pathological fracture 03/02/2022    Gastroesophageal reflux disease without esophagitis 08/30/2021    Viral upper respiratory tract infection 08/24/2021    Simple chronic bronchitis (HCC) 07/01/2021    Chronic hyponatremia 07/01/2021    History of right hip replacement 05/18/2021    Arthritis 02/23/2021    Bipolar affective disorder, currently depressed, mild (HCC) 02/23/2021    DDD (degenerative disc disease), thoracolumbar 02/23/2021    Dysphagia 02/23/2021    Episodic tension-type headache, not intractable 02/23/2021    Hepatic steatosis 02/23/2021    Long term (current) use of anticoagulants 02/23/2021    Murmur 02/23/2021    Prediabetes 02/23/2021    Solitary pulmonary nodule 02/23/2021    Obstructive sleep apnea of adult 10/25/2020    Paroxysmal atrial fibrillation (HCC) 10/25/2020    Dyslipidemia 10/25/2020    BMI 45.0-49.9, adult ([facility]) 12/31/2018    Chronic respiratory failure with hypoxia, on home oxygen therapy ([facility])  02/01/2018    History of fusion of cervical spine 12/29/2011    Essential hypertension 05/19/2011    Acquired hypothyroidism 05/19/2011    Elevated liver enzymes 05/19/2011    Chronic obstructive pulmonary disease (HCC) 09/25/2010    Cigarette nicotine dependence in remission 09/24/2010    Bipolar 2 disorder (HCC) 09/24/2010    DDD (degenerative disc disease), lumbar 09/24/2010    Mixed hyperlipidemia 09/24/2010      Allergies:Codeine, Divalproex sodium, Sulfa drugs, and Valproic acid    Current Outpatient Medications   Medication Sig Dispense Refill    DULoxetine (CYMBALTA) 20 MG Cap DR Particles Take 20 mg by mouth 2 times a day.      risperiDONE (RISPERDAL) 1 MG Tab Take 1 mg by mouth every evening.      carBAMazepine (TEGRETOL) 200 MG Tab Take 200 mg by mouth every day. Take 3 tablets PO once daily at bedtime.      cetirizine (ZYRTEC) 10 MG Tab Take 1 Tablet by mouth every day. 90 Tablet 3    fluticasone (FLONASE) 50 MCG/ACT nasal spray Administer 1 Spray into affected nostril(S) every day. Can increase to 1 spray into both nostrils twice daily if needed to resolve symptoms. 16 g 3    Home Care Oxygen 3 lpm      atorvastatin (LIPITOR) 80 MG tablet Take 1 Tablet by mouth at bedtime.      TRULICITY 1.5 MG/0.5ML Solution Pen-injector INJECT 1 SYRINGE SUBCUTANEOUSLY ONCE WEEKLY      GAVILYTE-G 236 g Recon Soln TAKE 240 ML (8 OUNCES) BY MOUTH EVERY 10 MINUTES UNTIL 4 LITERS ARE CONSUMED OR RECTAL EFFLUENT IS CLEAR. RAPID DRINKING OF EACH PORTION IS PREFERRED TO DRINKING SMALL AMOUNTS CONTINUOUSLY.      ipratropium-albuterol (COMBIVENT RESPIMAT)  MCG/ACT Aero Soln 1 puff(s)      metoprolol (TOPROL-XL) 200 MG XL tablet Take 1 Tablet by mouth every day.      XARELTO 20 MG Tab tablet Take 1 Tablet by mouth every day.      lubiprostone (AMITIZA) 24 MCG capsule Take 1 Capsule by mouth 2 times a day with meals. 60 Capsule 0    ondansetron (ZOFRAN ODT) 4 MG TABLET DISPERSIBLE Take 1 Tablet by mouth every 6 hours as  needed for Nausea. 16 Tablet 0    omeprazole (PRILOSEC) 20 MG delayed-release capsule Take 1 Capsule by mouth every day. 30 Capsule 2    NON SPECIFIED Nebulizer 1 Each 0    ipratropium-albuterol (DUONEB) 0.5-2.5 (3) MG/3ML nebulizer solution Take 3 mL by nebulization every four hours as needed for Shortness of Breath. 300 mL 3    COMBIVENT RESPIMAT  MCG/ACT Aero Soln INHALE 1 PUFF BY MOUTH EVERY 6 HOURS      Fluticasone-Umeclidin-Vilant (TRELEGY ELLIPTA) 100-62.5-25 MCG/INH AEROSOL POWDER, BREATH ACTIVATED Inhale 1 Puff by mouth every day. 1 Each 11    albuterol (PROAIR HFA) 108 (90 Base) MCG/ACT Aero Soln inhalation aerosol Inhale 2 Puffs by mouth every four hours as needed for Shortness of Breath. 1 Inhaler 11     No current facility-administered medications for this visit.       Social History     Tobacco Use    Smoking status: Former     Current packs/day: 0.00     Average packs/day: 2.0 packs/day for 40.0 years (80.0 ttl pk-yrs)     Types: Cigarettes     Start date: 1977     Quit date: 2017     Years since quittin.8     Passive exposure: Never    Smokeless tobacco: Never    Tobacco comments:     Continued abstinence   Vaping Use    Vaping Use: Never used   Substance Use Topics    Alcohol use: No    Drug use: No     Social History     Social History Narrative    Not on file       Family History   Problem Relation Age of Onset    Heart Disease Mother     Parkinson's Disease Mother     Asthma Mother     Hyperlipidemia Mother     Hypertension Mother     Alcohol abuse Mother     Cancer Father         lung    Alcohol abuse Father     GI Disease Sister         cirrhosis    Alcohol abuse Sister     Other Brother         Car accident    Lung Disease Sister         Emphysema    Alzheimer's Disease Sister     Cancer Sister         lung    Psychiatric Illness Sister        Exam:    BP (!) 177/86 (BP Location: Right arm, Patient Position: Sitting, BP Cuff Size: Adult)   Pulse 66   Temp 36.4 °C (97.6 °F)  "(Temporal)   Resp 16   Ht 1.626 m (5' 4\")   Wt 110 kg (242 lb)   LMP 09/24/2010   SpO2 95% Comment: 3L on oxygen  BMI 41.54 kg/m²  Body mass index is 41.54 kg/m².    Gen: Alert and oriented, No apparent distress. Well developed  HEENT: NCAT, MMM  Neck: Supple, FROM, mild tenderness to palpation bilaterally of neck and occipital head  Chest: No deformities, Equal chest expansion  Lungs: Normal effort, CTA bilaterally, no wheezes, rhonchi, or rales  CV: Regular rate and rhythm. No murmurs, rubs, or gallops. Pulse palpable  Ext: No clubbing, cyanosis, trace lower extremity edema  Skin: Warm/dry, without rashes  Neuro: Nonfocal  Psych: Normal behavior, normal affect      Tha Rossi MD  [facility] Resident    "

## 2023-10-24 NOTE — ASSESSMENT & PLAN NOTE
History of essential hypertension on chart, BP today is 177/86, previous appointment a month ago blood pressure was in the 130s.  They do have a blood pressure cuff at home.  -Patient's  agrees to check her blood pressure and bring in a log  -CMP recently checked, we will check TSH and UA.  -Consider starting ARB or ACE I at next appointment if BP is elevated.

## 2023-11-15 ENCOUNTER — OFFICE VISIT (OUTPATIENT)
Dept: MEDICAL GROUP | Facility: PHYSICIAN GROUP | Age: 68
End: 2023-11-15
Payer: MEDICARE

## 2023-11-15 VITALS
BODY MASS INDEX: 40.97 KG/M2 | SYSTOLIC BLOOD PRESSURE: 134 MMHG | OXYGEN SATURATION: 97 % | HEIGHT: 64 IN | TEMPERATURE: 97.1 F | WEIGHT: 240 LBS | DIASTOLIC BLOOD PRESSURE: 86 MMHG | RESPIRATION RATE: 14 BRPM | HEART RATE: 63 BPM

## 2023-11-15 DIAGNOSIS — R79.89 ELEVATED TSH: ICD-10-CM

## 2023-11-15 DIAGNOSIS — E66.01 MORBID OBESITY WITH BMI OF 40.0-44.9, ADULT (HCC): ICD-10-CM

## 2023-11-15 DIAGNOSIS — Z90.710 HISTORY OF HYSTERECTOMY: ICD-10-CM

## 2023-11-15 DIAGNOSIS — Z11.4 ENCOUNTER FOR SCREENING FOR HIV: ICD-10-CM

## 2023-11-15 DIAGNOSIS — R51.9 CHRONIC NONINTRACTABLE HEADACHE, UNSPECIFIED HEADACHE TYPE: ICD-10-CM

## 2023-11-15 DIAGNOSIS — Z11.59 NEED FOR HEPATITIS C SCREENING TEST: ICD-10-CM

## 2023-11-15 DIAGNOSIS — G89.29 CHRONIC NONINTRACTABLE HEADACHE, UNSPECIFIED HEADACHE TYPE: ICD-10-CM

## 2023-11-15 DIAGNOSIS — R73.03 PREDIABETES: ICD-10-CM

## 2023-11-15 DIAGNOSIS — E55.9 VITAMIN D DEFICIENCY: ICD-10-CM

## 2023-11-15 DIAGNOSIS — Z12.12 SCREENING FOR COLORECTAL CANCER: ICD-10-CM

## 2023-11-15 DIAGNOSIS — Z12.11 SCREENING FOR COLORECTAL CANCER: ICD-10-CM

## 2023-11-15 PROBLEM — J06.9 VIRAL UPPER RESPIRATORY TRACT INFECTION: Status: RESOLVED | Noted: 2021-08-24 | Resolved: 2023-11-15

## 2023-11-15 PROBLEM — F31.5 BIPOLAR AFFECTIVE DISORDER, DEPRESSED, SEVERE, WITH PSYCHOTIC BEHAVIOR (HCC): Status: ACTIVE | Noted: 2021-02-23

## 2023-11-15 PROBLEM — G44.209 TENSION HEADACHE: Status: RESOLVED | Noted: 2023-10-24 | Resolved: 2023-11-15

## 2023-11-15 PROBLEM — R44.3 HALLUCINATIONS: Status: ACTIVE | Noted: 2023-09-08

## 2023-11-15 PROBLEM — F06.1 CATATONIA: Status: ACTIVE | Noted: 2023-09-09

## 2023-11-15 PROCEDURE — 3075F SYST BP GE 130 - 139MM HG: CPT | Performed by: FAMILY MEDICINE

## 2023-11-15 PROCEDURE — 99214 OFFICE O/P EST MOD 30 MIN: CPT | Performed by: FAMILY MEDICINE

## 2023-11-15 PROCEDURE — 3079F DIAST BP 80-89 MM HG: CPT | Performed by: FAMILY MEDICINE

## 2023-11-15 RX ORDER — CYCLOBENZAPRINE HCL 5 MG
5 TABLET ORAL 3 TIMES DAILY PRN
Qty: 90 TABLET | Refills: 3 | Status: SHIPPED | OUTPATIENT
Start: 2023-11-15 | End: 2023-12-29

## 2023-11-15 RX ORDER — TRAZODONE HYDROCHLORIDE 300 MG/1
300 TABLET ORAL
COMMUNITY

## 2023-11-15 ASSESSMENT — FIBROSIS 4 INDEX: FIB4 SCORE: 1.16

## 2023-11-15 NOTE — PROGRESS NOTES
Subjective:   Herlinda Gill is a 67 y.o. female here today for evaluation and management of:     Morbid obesity with BMI of 40.0-44.9, adult (HCC)  Weight improving she lost 40 lbs intentionally with using trulicity.     Chronic nonintractable headache  Since childhood she has chronic daily headaches. Start at the back of her head and moved up to the top of her head, associated with throbbing eyes and vomiting sometimes. Throbbing pain in the back of her head occipital region.   Pain does not wake her at night, but about 10 am the pain starts again.   She had surgery with fusion of cervical spine in the past.   She is on 3L oxygen for copd, uses a cpap for suzy.   She had relief with muscle relaxants in the past. Rx for flexeril provided.              Current medicines (including changes today)  Current Outpatient Medications   Medication Sig Dispense Refill    trazodone (DESYREL) 300 MG tablet Take 300 mg by mouth at bedtime as needed.      cyclobenzaprine (FLEXERIL) 5 mg tablet Take 1 Tablet by mouth 3 times a day as needed for Moderate Pain. 90 Tablet 3    DULoxetine (CYMBALTA) 20 MG Cap DR Particles Take 20 mg by mouth 2 times a day.      risperiDONE (RISPERDAL) 1 MG Tab Take 1 mg by mouth every evening.      carBAMazepine (TEGRETOL) 200 MG Tab Take 200 mg by mouth every day. Take 3 tablets PO once daily at bedtime.      cetirizine (ZYRTEC) 10 MG Tab Take 1 Tablet by mouth every day. 90 Tablet 3    fluticasone (FLONASE) 50 MCG/ACT nasal spray Administer 1 Spray into affected nostril(S) every day. Can increase to 1 spray into both nostrils twice daily if needed to resolve symptoms. 16 g 3    Home Care Oxygen 3 lpm      atorvastatin (LIPITOR) 80 MG tablet Take 1 Tablet by mouth at bedtime.      TRULICITY 1.5 MG/0.5ML Solution Pen-injector INJECT 1 SYRINGE SUBCUTANEOUSLY ONCE WEEKLY      ipratropium-albuterol (COMBIVENT RESPIMAT)  MCG/ACT Aero Soln 1 puff(s)      metoprolol (TOPROL-XL) 200 MG XL tablet Take 1  Tablet by mouth every day.      XARELTO 20 MG Tab tablet Take 1 Tablet by mouth every day.      lubiprostone (AMITIZA) 24 MCG capsule Take 1 Capsule by mouth 2 times a day with meals. 60 Capsule 0    NON SPECIFIED Nebulizer 1 Each 0    ipratropium-albuterol (DUONEB) 0.5-2.5 (3) MG/3ML nebulizer solution Take 3 mL by nebulization every four hours as needed for Shortness of Breath. 300 mL 3    COMBIVENT RESPIMAT  MCG/ACT Aero Soln INHALE 1 PUFF BY MOUTH EVERY 6 HOURS      Fluticasone-Umeclidin-Vilant (TRELEGY ELLIPTA) 100-62.5-25 MCG/INH AEROSOL POWDER, BREATH ACTIVATED Inhale 1 Puff by mouth every day. 1 Each 11    albuterol (PROAIR HFA) 108 (90 Base) MCG/ACT Aero Soln inhalation aerosol Inhale 2 Puffs by mouth every four hours as needed for Shortness of Breath. 1 Inhaler 11     No current facility-administered medications for this visit.     She  has a past medical history of Allergic rhinitis, Anemia, Anxiety, Arthritis, Atrial fibrillation ([facility]), Back pain, Bipolar affective disorder ([facility]), Breath shortness, Bronchitis, Cataract, Chest tightness, Chickenpox, Constipation, COPD (chronic obstructive pulmonary disease) ([facility]), Daytime sleepiness, Depression, Diabetes ([facility]), Dyspnea on exertion (10/25/2020), GERD (gastroesophageal reflux disease), Hyperlipidemia, Hypertension, Hypoxia (12/31/2018), IBD (inflammatory bowel disease), Indigestion, Lumbar disc disease, Menopause, Migraine, Morning headache, Murmur (2/23/2021), Nasal drainage, Obesity, Pneumonia, Primary osteoarthritis of right hip (4/26/2021), Pulmonary emphysema ([facility]), Shortness of breath, Sinusitis, Sleep apnea, Thyroid disease, Wears glasses, and Wheezing.    She has no past medical history of Addisons disease ([facility]), Adrenal disorder ([facility]), Asthma, Blood transfusion without reported diagnosis, Breast cancer ([facility]), Cancer ([facility]), CHF (congestive heart failure) ([facility]), Clotting disorder ([facility]), Cushings syndrome ([facility]), Glaucoma, Head ache, Heart attack  "(HCC), HIV (human immunodeficiency virus infection) (HCC), Kidney disease, Seizure (HCC), Stroke (HCC), or Substance abuse (HCC).    ROS  No chest pain, no shortness of breath, no abdominal pain       Objective:     /86   Pulse 63   Temp 36.2 °C (97.1 °F) (Temporal)   Resp 14   Ht 1.613 m (5' 3.5\")   Wt 109 kg (240 lb)   SpO2 97%  Body mass index is 41.85 kg/m².   Physical Exam:  Constitutional: Alert, no distress.  Skin: Warm, dry, good turgor, no rashes in visible areas.  Eye: Equal, round and reactive, conjunctiva clear, lids normal.  ENMT: Lips without lesions, good dentition, oropharynx clear.  Neck: Trachea midline, no masses, no thyromegaly. No cervical or supraclavicular lymphadenopathy  Respiratory: Unlabored respiratory effort, lungs clear to auscultation, no wheezes, no ronchi.  Cardiovascular: Normal S1, S2, no murmur, no edema.  Abdomen: Soft, non-tender, no masses, no hepatosplenomegaly.  Psych: Alert and oriented x3, normal affect and mood.        Assessment and Plan:   The following treatment plan was discussed    1. Need for hepatitis C screening test  - HEP C VIRUS ANTIBODY; Future    2. Screening for colorectal cancer    3. Morbid obesity with BMI of 40.0-44.9, adult (HCC)    4. Chronic nonintractable headache, unspecified headache type    5. Encounter for screening for HIV  - HIV AG/AB COMBO ASSAY SCREENING; Future    6. Elevated TSH  - TSH WITH REFLEX TO FT4; Future    7. Vitamin D deficiency  - VITAMIN D,25 HYDROXY (DEFICIENCY); Future    8. Prediabetes  - HEMOGLOBIN A1C; Future    9. History of hysterectomy    Other orders  - trazodone (DESYREL) 300 MG tablet; Take 300 mg by mouth at bedtime as needed.  - cyclobenzaprine (FLEXERIL) 5 mg tablet; Take 1 Tablet by mouth 3 times a day as needed for Moderate Pain.  Dispense: 90 Tablet; Refill: 3  - Obtain Results: Colonoscopy; Obtain Results From: Jay Pan      Followup: No follow-ups on file.           "

## 2023-11-15 NOTE — LETTER
Critical access hospital  Nancy Guidry M.D.  [address] [place] [address]  Fax: [phone]   Authorization for Release/Disclosure of   Protected Health Information   Name: JOSE ROBERTO ROSE : 1955 SSN: [id number]   Address: [address] [address] Phone:    [phone] (home)    I authorize the entity listed below to release/disclose the PHI below to:   Critical access hospital/Nancy Guidry M.D. and Nancy Guidry M.D.   Provider or Entity Name:  Jay Pan      Address   City, State, [facility]   Phone:      Fax:     Reason for request: continuity of care   Information to be released:    [X] LAST COLONOSCOPY,  including any PATH REPORT and follow-up  [  ] LAST FIT/COLOGUARD RESULT [  ] LAST DEXA  [  ] LAST MAMMOGRAM  [  ] LAST PAP  [  ] LAST LABS [  ] RETINA EXAM REPORT  [  ] IMMUNIZATION RECORDS  [  ] Release all info      [  ] Check here and initial the line next to each item to release ALL health information INCLUDING  _____ Care and treatment for drug and / or alcohol abuse  _____ HIV testing, infection status, or AIDS  _____ Genetic Testing    DATES OF SERVICE OR TIME PERIOD TO BE DISCLOSED: _____________  I understand and acknowledge that:  * This Authorization may be revoked at any time by you in writing, except if your health information has already been used or disclosed.  * Your health information that will be used or disclosed as a result of you signing this authorization could be re-disclosed by the recipient. If this occurs, your re-disclosed health information may no longer be protected by State or Federal laws.  * You may refuse to sign this Authorization. Your refusal will not affect your ability to obtain treatment.  * This Authorization becomes effective upon signing and will  on (date) __________.      If no date is indicated, this Authorization will  one (1) year from the signature date.    Name: Jose Roberto Rose  Signature: Date:   11/15/2023     PLEASE FAX  REQUESTED RECORDS BACK TO: [phone]

## 2023-11-15 NOTE — ASSESSMENT & PLAN NOTE
Since childhood she has chronic daily headaches. Start at the back of her head and moved up to the top of her head, associated with throbbing eyes and vomiting sometimes. Throbbing pain in the back of her head occipital region.   Pain does not wake her at night, but about 10 am the pain starts again.   She had surgery with fusion of cervical spine in the past.   She is on 3L oxygen for copd, uses a cpap for suzy.   She had relief with muscle relaxants in the past. Rx for flexeril provided.

## 2023-11-27 ENCOUNTER — APPOINTMENT (OUTPATIENT)
Dept: MEDICAL GROUP | Facility: CLINIC | Age: 68
End: 2023-11-27
Payer: MEDICARE

## 2023-12-11 ENCOUNTER — NON-PROVIDER VISIT (OUTPATIENT)
Dept: MEDICAL GROUP | Facility: CLINIC | Age: 68
End: 2023-12-11
Payer: MEDICARE

## 2023-12-11 ENCOUNTER — HOSPITAL ENCOUNTER (OUTPATIENT)
Facility: MEDICAL CENTER | Age: 68
End: 2023-12-11
Payer: MEDICARE

## 2023-12-11 ENCOUNTER — HOSPITAL ENCOUNTER (OUTPATIENT)
Facility: MEDICAL CENTER | Age: 68
End: 2023-12-11
Attending: FAMILY MEDICINE
Payer: MEDICARE

## 2023-12-11 DIAGNOSIS — Z11.59 NEED FOR HEPATITIS C SCREENING TEST: ICD-10-CM

## 2023-12-11 DIAGNOSIS — I10 ESSENTIAL HYPERTENSION: ICD-10-CM

## 2023-12-11 DIAGNOSIS — E55.9 VITAMIN D DEFICIENCY: ICD-10-CM

## 2023-12-11 DIAGNOSIS — R73.03 PREDIABETES: ICD-10-CM

## 2023-12-11 DIAGNOSIS — Z11.4 ENCOUNTER FOR SCREENING FOR HIV: ICD-10-CM

## 2023-12-11 DIAGNOSIS — R79.89 ELEVATED TSH: ICD-10-CM

## 2023-12-11 LAB
25(OH)D3 SERPL-MCNC: 38 NG/ML (ref 30–100)
APPEARANCE UR: ABNORMAL
BACTERIA #/AREA URNS HPF: ABNORMAL /HPF
BILIRUB UR QL STRIP.AUTO: NEGATIVE
COLOR UR: YELLOW
EPI CELLS #/AREA URNS HPF: ABNORMAL /HPF
EST. AVERAGE GLUCOSE BLD GHB EST-MCNC: 111 MG/DL
GLUCOSE UR STRIP.AUTO-MCNC: NEGATIVE MG/DL
HBA1C MFR BLD: 5.5 % (ref 4–5.6)
HCV AB SER QL: NORMAL
HYALINE CASTS #/AREA URNS LPF: ABNORMAL /LPF
KETONES UR STRIP.AUTO-MCNC: NEGATIVE MG/DL
LEUKOCYTE ESTERASE UR QL STRIP.AUTO: ABNORMAL
MICRO URNS: ABNORMAL
NITRITE UR QL STRIP.AUTO: POSITIVE
PH UR STRIP.AUTO: 6 [PH] (ref 5–8)
PROT UR QL STRIP: NEGATIVE MG/DL
RBC # URNS HPF: ABNORMAL /HPF
RBC UR QL AUTO: ABNORMAL
SP GR UR STRIP.AUTO: 1.01
TSH SERPL DL<=0.005 MIU/L-ACNC: 4.63 UIU/ML (ref 0.38–5.33)
UROBILINOGEN UR STRIP.AUTO-MCNC: 0.2 MG/DL
WBC #/AREA URNS HPF: ABNORMAL /HPF

## 2023-12-11 PROCEDURE — 84443 ASSAY THYROID STIM HORMONE: CPT

## 2023-12-11 PROCEDURE — 83036 HEMOGLOBIN GLYCOSYLATED A1C: CPT

## 2023-12-11 PROCEDURE — 86803 HEPATITIS C AB TEST: CPT

## 2023-12-11 PROCEDURE — 81001 URINALYSIS AUTO W/SCOPE: CPT

## 2023-12-11 PROCEDURE — 82306 VITAMIN D 25 HYDROXY: CPT

## 2023-12-11 PROCEDURE — 36415 COLL VENOUS BLD VENIPUNCTURE: CPT | Performed by: PHYSICIAN ASSISTANT

## 2023-12-11 NOTE — PROGRESS NOTES
Herlinda Gill is a 67 y.o. female here for a non-provider visit for a lab draw on 12/11/2023 at 8:33 AM.    Procedure performed:  Venipuncture     Anatomical site:  Right Antecubital Area    Equipment used:  21 g butterfly     Labs drawn:  A1c, Vitamin D , TSH, and HIV Ag/AB Combo Assay, Hep C virus antibody. Urinalysis.    Ordering provider:  Nancy Guidry M.D. and Tha Rossi M.D.     Lab draw completed by:  Beth Curry, Med Ass't

## 2023-12-29 ENCOUNTER — OFFICE VISIT (OUTPATIENT)
Dept: MEDICAL GROUP | Facility: PHYSICIAN GROUP | Age: 68
End: 2023-12-29
Payer: MEDICARE

## 2023-12-29 VITALS
HEIGHT: 64 IN | TEMPERATURE: 98.6 F | BODY MASS INDEX: 43.71 KG/M2 | OXYGEN SATURATION: 95 % | RESPIRATION RATE: 16 BRPM | WEIGHT: 256 LBS | HEART RATE: 79 BPM | DIASTOLIC BLOOD PRESSURE: 80 MMHG | SYSTOLIC BLOOD PRESSURE: 122 MMHG

## 2023-12-29 DIAGNOSIS — Z12.11 SCREENING FOR COLORECTAL CANCER: ICD-10-CM

## 2023-12-29 DIAGNOSIS — Z12.12 SCREENING FOR COLORECTAL CANCER: ICD-10-CM

## 2023-12-29 DIAGNOSIS — G43.119 INTRACTABLE MIGRAINE WITH AURA WITHOUT STATUS MIGRAINOSUS: ICD-10-CM

## 2023-12-29 PROBLEM — G43.909 MIGRAINE: Status: ACTIVE | Noted: 2023-12-29

## 2023-12-29 PROCEDURE — 3074F SYST BP LT 130 MM HG: CPT | Performed by: FAMILY MEDICINE

## 2023-12-29 PROCEDURE — 3079F DIAST BP 80-89 MM HG: CPT | Performed by: FAMILY MEDICINE

## 2023-12-29 PROCEDURE — 99214 OFFICE O/P EST MOD 30 MIN: CPT | Performed by: FAMILY MEDICINE

## 2023-12-29 RX ORDER — RISPERIDONE 2 MG/1
TABLET ORAL
COMMUNITY
End: 2023-12-29

## 2023-12-29 RX ORDER — ONDANSETRON 4 MG/1
TABLET, ORALLY DISINTEGRATING ORAL
COMMUNITY
End: 2023-12-29

## 2023-12-29 RX ORDER — FLUTICASONE PROPIONATE AND SALMETEROL 500; 50 UG/1; UG/1
POWDER RESPIRATORY (INHALATION)
COMMUNITY
End: 2023-12-29

## 2023-12-29 RX ORDER — ALBUTEROL SULFATE 2.5 MG/3ML
SOLUTION RESPIRATORY (INHALATION)
COMMUNITY
End: 2023-12-29

## 2023-12-29 RX ORDER — HYDROCODONE BITARTRATE AND ACETAMINOPHEN 10; 325 MG/1; MG/1
TABLET ORAL
COMMUNITY
End: 2023-12-29

## 2023-12-29 RX ORDER — MELOXICAM 15 MG/1
TABLET ORAL
COMMUNITY
Start: 2023-10-21 | End: 2023-12-29

## 2023-12-29 RX ORDER — WARFARIN SODIUM 5 MG/1
TABLET ORAL
COMMUNITY
End: 2023-12-29

## 2023-12-29 RX ORDER — DULAGLUTIDE 3 MG/.5ML
INJECTION, SOLUTION SUBCUTANEOUS
COMMUNITY
Start: 2023-12-18 | End: 2023-12-29

## 2023-12-29 RX ORDER — RISPERIDONE 3 MG/1
TABLET ORAL
COMMUNITY
End: 2023-12-29

## 2023-12-29 RX ORDER — CEFPODOXIME PROXETIL 100 MG/1
TABLET, FILM COATED ORAL
COMMUNITY
End: 2023-12-29

## 2023-12-29 RX ORDER — METHOCARBAMOL 750 MG/1
TABLET, FILM COATED ORAL
COMMUNITY
End: 2023-12-29

## 2023-12-29 RX ORDER — LEVOTHYROXINE SODIUM 0.07 MG/1
TABLET ORAL
COMMUNITY
End: 2023-12-29

## 2023-12-29 RX ORDER — NITROFURANTOIN 25; 75 MG/1; MG/1
100 CAPSULE ORAL 2 TIMES DAILY
Qty: 6 CAPSULE | Refills: 0 | Status: SHIPPED | OUTPATIENT
Start: 2023-12-29 | End: 2024-01-01

## 2023-12-29 RX ORDER — TRAZODONE HYDROCHLORIDE 100 MG/1
TABLET ORAL
COMMUNITY
End: 2023-12-29

## 2023-12-29 RX ORDER — LOSARTAN POTASSIUM 50 MG/1
1 TABLET ORAL
COMMUNITY

## 2023-12-29 RX ORDER — DIAZEPAM 10 MG/1
TABLET ORAL
COMMUNITY
End: 2023-12-29

## 2023-12-29 RX ORDER — METOPROLOL SUCCINATE 25 MG/1
TABLET, EXTENDED RELEASE ORAL
COMMUNITY
End: 2023-12-29

## 2023-12-29 RX ORDER — SPIRONOLACTONE 25 MG/1
TABLET ORAL
COMMUNITY
End: 2023-12-29

## 2023-12-29 RX ORDER — CEFDINIR 300 MG/1
CAPSULE ORAL
COMMUNITY
End: 2023-12-29

## 2023-12-29 RX ORDER — LOSARTAN POTASSIUM 100 MG/1
1 TABLET ORAL
COMMUNITY
End: 2023-12-29

## 2023-12-29 RX ORDER — OMEPRAZOLE 40 MG/1
CAPSULE, DELAYED RELEASE ORAL
COMMUNITY
End: 2023-12-29

## 2023-12-29 RX ORDER — INDOMETHACIN 50 MG/1
CAPSULE ORAL
COMMUNITY
End: 2023-12-29

## 2023-12-29 RX ORDER — CITALOPRAM HYDROBROMIDE 10 MG/1
TABLET ORAL
COMMUNITY
End: 2023-12-29

## 2023-12-29 RX ORDER — SUMATRIPTAN 50 MG/1
50 TABLET, FILM COATED ORAL
Qty: 10 TABLET | Refills: 3 | Status: SHIPPED | OUTPATIENT
Start: 2023-12-29

## 2023-12-29 RX ORDER — GABAPENTIN 400 MG/1
1 CAPSULE ORAL 3 TIMES DAILY
COMMUNITY
End: 2023-12-29

## 2023-12-29 RX ORDER — TRAZODONE HYDROCHLORIDE 150 MG/1
TABLET ORAL
COMMUNITY
End: 2023-12-29

## 2023-12-29 RX ORDER — LEVOTHYROXINE SODIUM 0.03 MG/1
TABLET ORAL
COMMUNITY
End: 2023-12-29

## 2023-12-29 RX ORDER — FUROSEMIDE 20 MG/1
TABLET ORAL
COMMUNITY
End: 2023-12-29

## 2023-12-29 ASSESSMENT — FIBROSIS 4 INDEX: FIB4 SCORE: 1.17

## 2023-12-29 NOTE — PROGRESS NOTES
Subjective:   Herlinda Gill is a 68 y.o. female here today for evaluation and management of:     Intractable migraine with aura without status migrainosus  Migraines for 2 years, just started spontaneously  She has not tried imitrex, rx provided  She has a migraine most days  Light sensitivity present, no nausea or vomiting.  No vision changes or vision loss. No hearing loss, no falls.   Cold packs on her head and neck numb the pain briefly.   Has tried gabapentin and tegretol which did not help. Not improved with tylenol or ibuprofen                 Current medicines (including changes today)  Current Outpatient Medications   Medication Sig Dispense Refill    Rimegepant Sulfate (NURTEC) 75 MG TABLET DISPERSIBLE Take 1 Tablet by mouth every day. For migraines 30 Tablet 5    SUMAtriptan (IMITREX) 50 MG Tab Take 1 Tablet by mouth one time as needed for Migraine for up to 1 dose. 10 Tablet 3    nitrofurantoin (MACROBID) 100 MG Cap Take 1 Capsule by mouth 2 times a day for 3 days. 6 Capsule 0    trazodone (DESYREL) 300 MG tablet Take 300 mg by mouth at bedtime as needed.      carBAMazepine (TEGRETOL) 200 MG Tab Take 200 mg by mouth every day. Take 3 tablets PO once daily at bedtime.      Home Care Oxygen 3 lpm      TRULICITY 1.5 MG/0.5ML Solution Pen-injector INJECT 1 SYRINGE SUBCUTANEOUSLY ONCE WEEKLY      metoprolol (TOPROL-XL) 200 MG XL tablet Take 1 Tablet by mouth every day.      lubiprostone (AMITIZA) 24 MCG capsule Take 1 Capsule by mouth 2 times a day with meals. 60 Capsule 0    NON SPECIFIED Nebulizer 1 Each 0    ipratropium-albuterol (DUONEB) 0.5-2.5 (3) MG/3ML nebulizer solution Take 3 mL by nebulization every four hours as needed for Shortness of Breath. 300 mL 3    albuterol (PROAIR HFA) 108 (90 Base) MCG/ACT Aero Soln inhalation aerosol Inhale 2 Puffs by mouth every four hours as needed for Shortness of Breath. 1 Inhaler 11    losartan (COZAAR) 50 MG Tab Take 1 Tablet by mouth every day.       No current  "facility-administered medications for this visit.     She  has a past medical history of Allergic rhinitis, Anemia, Anxiety, Arthritis, Atrial fibrillation (HCC), Back pain, Bipolar affective disorder (HCC), Breath shortness, Bronchitis, Cataract, Chest tightness, Chickenpox, Constipation, COPD (chronic obstructive pulmonary disease) (HCC), Daytime sleepiness, Depression, Diabetes (HCC), Dyspnea on exertion (10/25/2020), GERD (gastroesophageal reflux disease), Hyperlipidemia, Hypertension, Hypoxia (12/31/2018), IBD (inflammatory bowel disease), Indigestion, Lumbar disc disease, Menopause, Migraine, Morning headache, Murmur (2/23/2021), Nasal drainage, Obesity, Pneumonia, Primary osteoarthritis of right hip (4/26/2021), Pulmonary emphysema (HCC), Shortness of breath, Sinusitis, Sleep apnea, Thyroid disease, Wears glasses, and Wheezing.    She has no past medical history of Addisons disease ([facility]), Adrenal disorder ([facility]), Asthma, Blood transfusion without reported diagnosis, Breast cancer ([facility]), Cancer (HCC), CHF (congestive heart failure) (HCC), Clotting disorder (HCC), Cushings syndrome (HCC), Glaucoma, Head ache, Heart attack (HCC), HIV (human immunodeficiency virus infection) ([facility]), Kidney disease, Seizure (HCC), Stroke (HCC), or Substance abuse (HCC).    ROS  No chest pain, no shortness of breath, no abdominal pain       Objective:     /80 (BP Location: Left arm, Patient Position: Sitting, BP Cuff Size: Adult long)   Pulse 79   Temp 37 °C (98.6 °F) (Temporal)   Resp 16   Ht 1.613 m (5' 3.5\")   Wt 116 kg (256 lb)   SpO2 95%  Body mass index is 44.64 kg/m².   Physical Exam:  Constitutional: Alert, no distress. On portable oxygen via NC  Skin: Warm, dry, good turgor, no rashes in visible areas.  Eye: Equal, round and reactive, conjunctiva clear, lids normal.  ENMT: Lips without lesions, good dentition, oropharynx clear.  Neck: Trachea midline, no masses, no thyromegaly. No cervical or supraclavicular " lymphadenopathy  Respiratory: Unlabored respiratory effort, lungs clear to auscultation, no wheezes, no ronchi.  Cardiovascular: Normal S1, S2, systolic murmur, no edema.  Abdomen: Soft, non-tender, no masses, no hepatosplenomegaly.  Psych: Alert and oriented x3, normal affect and mood.        Assessment and Plan:   The following treatment plan was discussed    1. Intractable migraine with aura without status migrainosus    2. Screening for colorectal cancer  - Referral to GI for Colonoscopy    Other orders  - losartan (COZAAR) 50 MG Tab; Take 1 Tablet by mouth every day.  - Rimegepant Sulfate (NURTEC) 75 MG TABLET DISPERSIBLE; Take 1 Tablet by mouth every day. For migraines  Dispense: 30 Tablet; Refill: 5  - SUMAtriptan (IMITREX) 50 MG Tab; Take 1 Tablet by mouth one time as needed for Migraine for up to 1 dose.  Dispense: 10 Tablet; Refill: 3  - nitrofurantoin (MACROBID) 100 MG Cap; Take 1 Capsule by mouth 2 times a day for 3 days.  Dispense: 6 Capsule; Refill: 0      Followup: Return for feb as scheduled , Lab Review.

## 2023-12-29 NOTE — ASSESSMENT & PLAN NOTE
Migraines for 2 years, just started spontaneously  She has not tried imitrex, rx provided  She has a migraine most days  Light sensitivity present, no nausea or vomiting.  No vision changes or vision loss. No hearing loss, no falls.   Cold packs on her head and neck numb the pain briefly.   Has tried gabapentin and tegretol which did not help. Not improved with tylenol or ibuprofen

## 2024-02-07 ENCOUNTER — APPOINTMENT (OUTPATIENT)
Dept: MEDICAL GROUP | Facility: PHYSICIAN GROUP | Age: 69
End: 2024-02-07
Payer: MEDICARE

## 2024-02-21 ENCOUNTER — APPOINTMENT (OUTPATIENT)
Dept: RADIOLOGY | Facility: IMAGING CENTER | Age: 69
End: 2024-02-21
Attending: NURSE PRACTITIONER
Payer: MEDICARE

## 2024-02-21 ENCOUNTER — OFFICE VISIT (OUTPATIENT)
Dept: MEDICAL GROUP | Facility: PHYSICIAN GROUP | Age: 69
End: 2024-02-21
Payer: MEDICARE

## 2024-02-21 VITALS
WEIGHT: 251 LBS | BODY MASS INDEX: 42.85 KG/M2 | SYSTOLIC BLOOD PRESSURE: 124 MMHG | HEART RATE: 57 BPM | OXYGEN SATURATION: 99 % | HEIGHT: 64 IN | TEMPERATURE: 97.2 F | DIASTOLIC BLOOD PRESSURE: 62 MMHG | RESPIRATION RATE: 18 BRPM

## 2024-02-21 DIAGNOSIS — M25.559 HIP PAIN, UNSPECIFIED LATERALITY: ICD-10-CM

## 2024-02-21 DIAGNOSIS — M25.552 BILATERAL HIP PAIN: ICD-10-CM

## 2024-02-21 DIAGNOSIS — J44.9 CHRONIC OBSTRUCTIVE PULMONARY DISEASE, UNSPECIFIED COPD TYPE (HCC): ICD-10-CM

## 2024-02-21 DIAGNOSIS — J96.11 CHRONIC RESPIRATORY FAILURE WITH HYPOXIA, ON HOME OXYGEN THERAPY (HCC): ICD-10-CM

## 2024-02-21 DIAGNOSIS — M54.50 LUMBAR PAIN: ICD-10-CM

## 2024-02-21 DIAGNOSIS — R05.1 ACUTE COUGH: ICD-10-CM

## 2024-02-21 DIAGNOSIS — M25.551 BILATERAL HIP PAIN: ICD-10-CM

## 2024-02-21 DIAGNOSIS — J06.9 VIRAL UPPER RESPIRATORY TRACT INFECTION: ICD-10-CM

## 2024-02-21 DIAGNOSIS — Z99.81 CHRONIC RESPIRATORY FAILURE WITH HYPOXIA, ON HOME OXYGEN THERAPY (HCC): ICD-10-CM

## 2024-02-21 PROCEDURE — 99214 OFFICE O/P EST MOD 30 MIN: CPT | Performed by: NURSE PRACTITIONER

## 2024-02-21 PROCEDURE — 3074F SYST BP LT 130 MM HG: CPT | Performed by: NURSE PRACTITIONER

## 2024-02-21 PROCEDURE — 72100 X-RAY EXAM L-S SPINE 2/3 VWS: CPT | Mod: TC,FY | Performed by: NURSE PRACTITIONER

## 2024-02-21 PROCEDURE — 3078F DIAST BP <80 MM HG: CPT | Performed by: NURSE PRACTITIONER

## 2024-02-21 PROCEDURE — 73521 X-RAY EXAM HIPS BI 2 VIEWS: CPT | Mod: TC,FY | Performed by: NURSE PRACTITIONER

## 2024-02-21 RX ORDER — IPRATROPIUM BROMIDE AND ALBUTEROL SULFATE 2.5; .5 MG/3ML; MG/3ML
3 SOLUTION RESPIRATORY (INHALATION) EVERY 4 HOURS PRN
Qty: 360 EACH | Refills: 3 | Status: SHIPPED | OUTPATIENT
Start: 2024-02-21

## 2024-02-21 RX ORDER — ALBUTEROL SULFATE 90 UG/1
2 AEROSOL, METERED RESPIRATORY (INHALATION) EVERY 4 HOURS PRN
Qty: 1 EACH | Refills: 11 | Status: SHIPPED | OUTPATIENT
Start: 2024-02-21

## 2024-02-21 ASSESSMENT — FIBROSIS 4 INDEX: FIB4 SCORE: 1.17

## 2024-02-21 NOTE — ASSESSMENT & PLAN NOTE
Hasn't had an inhaler in a while (lost it)  Was using a couple times per day  Also out of the Duoneb

## 2024-02-21 NOTE — PROGRESS NOTES
"Subjective:     CC:   Chief Complaint   Patient presents with    Back Pain     Hip Pain     Cough     1-2 weeks        HPI:   Herlinda presents today with    Bilateral hip pain  Hx of right hip replacement in approx 2-3 yrs ago  Reports feeling better; pain returned approx 2-3 months  Reports worse when going from sitting to standing  Goes away for a bit then returns  She started going to the gym in the last week to see if that would help helping a bit  Tried tylenol (can't tolerate motrin d/t stomach pain) and heating pad; uses ice if heat not effective     Hasn't had any PT since surgery  Is open to it and ortho referral     Chronic obstructive pulmonary disease (HCC)  Hasn't had an inhaler in a while (lost it)  Was using a couple times per day  Also out of the Duoneb    Acute cough  Reports a dry cough for approx 2 weeks   Not productive   Denies fever, chills, n/v/d, no sore throat               ROS per HPI    Objective:     Exam:  /62 (BP Location: Right arm, Patient Position: Sitting, BP Cuff Size: Adult long)   Pulse (!) 57   Temp 36.2 °C (97.2 °F) (Temporal)   Resp 18   Ht 1.626 m (5' 4\")   Wt 114 kg (251 lb)   LMP 09/24/2010   SpO2 99% Comment: portable set at 3  BMI 43.08 kg/m²  Body mass index is 43.08 kg/m².    Physical Exam:  Constitutional: Well-developed and well-nourished female. Not diaphoretic. No distress.   Skin: warm, dry, intact, no evidence of rash or concerning lesions  Head: Atraumatic without lesions.  Eyes: Conjunctivae are normal. Pupils are equal, round. No scleral icterus.   Ears:  External ears unremarkable. Tms wnl  Throat: no erythema or exudates  Neck: Supple, trachea midline. No thyromegaly present. No cervical or supraclavicular lymphadenopathy.  Cardiovascular: Regular rate and rhythm without murmur.   Pulmonary: Clear to ausculation. Normal effort. No rales, ronchi, or wheezing.  Extremities: No cyanosis, clubbing, erythema, nor edema.   Neurological: Alert and " oriented x 3.   Psychiatric:  Behavior, mood, and affect are appropriate.        Assessment & Plan:     68 y.o. female with the following -     1. Lumbar pain  - DX-LUMBAR SPINE-2 OR 3 VIEWS; Future  - Referral to Physical Therapy  - Referral to Orthopedics  Does not have MyChart, so I advised her to stop by in the next 7 to 10 days to see if her referrals have been approved   she can get a copy and call to schedule an appointment    2. Hip pain, unspecified laterality  - Referral to Physical Therapy  - Referral to Orthopedics    3. Bilateral hip pain  - DX-HIP-BILATERAL-WITH PELVIS-2 VIEWS; Future    4. Chronic obstructive pulmonary disease, unspecified COPD type (HCC)  - ipratropium-albuterol (DUONEB) 0.5-2.5 (3) MG/3ML nebulizer solution; Take 3 mL by nebulization every four hours as needed for Shortness of Breath.  Dispense: 360 Each; Refill: 3  - DME Nebulizer  Pt doesn't have a nebulizer anymore; sending to [facility]; advised pt to call in a week if she hasn't heard from someone     5. Acute cough  See #6    6. Chronic respiratory failure with hypoxia, on home oxygen therapy (HCC)  - ipratropium-albuterol (DUONEB) 0.5-2.5 (3) MG/3ML nebulizer solution; Take 3 mL by nebulization every four hours as needed for Shortness of Breath.  Dispense: 360 Each; Refill: 3    Other orders  - albuterol (PROAIR HFA) 108 (90 Base) MCG/ACT Aero Soln inhalation aerosol; Inhale 2 Puffs every four hours as needed for Shortness of Breath. And 15 min before exercise; max 4x daily  Dispense: 1 Each; Refill: 11         Return in about 2 months (around 4/21/2024) for as scheduled w/PCP.    Please note that this dictation was created using voice recognition software. I have made every reasonable attempt to correct obvious errors, but I expect that there are errors of grammar and possibly content that I did not discover before finalizing the note.

## 2024-02-21 NOTE — ASSESSMENT & PLAN NOTE
Hx of right hip replacement in approx 2-3 yrs ago  Reports feeling better; pain returned approx 2-3 months  Reports worse when going from sitting to standing  Goes away for a bit then returns  She started going to the gym in the last week to see if that would help helping a bit  Tried tylenol (can't tolerate motrin d/t stomach pain) and heating pad; uses ice if heat not effective     Hasn't had any PT since surgery  Is open to it and ortho referral

## 2024-02-21 NOTE — ASSESSMENT & PLAN NOTE
Reports a dry cough for approx 2 weeks   Not productive   Denies fever, chills, n/v/d, no sore throat

## 2024-04-05 ENCOUNTER — APPOINTMENT (OUTPATIENT)
Dept: MEDICAL GROUP | Facility: PHYSICIAN GROUP | Age: 69
End: 2024-04-05
Payer: MEDICARE

## 2024-04-05 VITALS
RESPIRATION RATE: 16 BRPM | SYSTOLIC BLOOD PRESSURE: 132 MMHG | BODY MASS INDEX: 44.56 KG/M2 | HEIGHT: 64 IN | DIASTOLIC BLOOD PRESSURE: 72 MMHG | WEIGHT: 261 LBS | OXYGEN SATURATION: 97 % | HEART RATE: 64 BPM | TEMPERATURE: 96.4 F

## 2024-04-05 DIAGNOSIS — Z12.83 SKIN CANCER SCREENING: ICD-10-CM

## 2024-04-05 DIAGNOSIS — G43.119 INTRACTABLE MIGRAINE WITH AURA WITHOUT STATUS MIGRAINOSUS: ICD-10-CM

## 2024-04-05 DIAGNOSIS — Z12.11 SCREENING FOR COLORECTAL CANCER: ICD-10-CM

## 2024-04-05 DIAGNOSIS — Z12.12 SCREENING FOR COLORECTAL CANCER: ICD-10-CM

## 2024-04-05 DIAGNOSIS — L98.9 SKIN LESIONS: ICD-10-CM

## 2024-04-05 DIAGNOSIS — M54.12 CERVICAL RADICULOPATHY: ICD-10-CM

## 2024-04-05 DIAGNOSIS — Z12.31 ENCOUNTER FOR SCREENING MAMMOGRAM FOR BREAST CANCER: ICD-10-CM

## 2024-04-05 DIAGNOSIS — R73.03 PREDIABETES: ICD-10-CM

## 2024-04-05 DIAGNOSIS — E78.1 HYPERTRIGLYCERIDEMIA: ICD-10-CM

## 2024-04-05 PROCEDURE — 3075F SYST BP GE 130 - 139MM HG: CPT | Performed by: FAMILY MEDICINE

## 2024-04-05 PROCEDURE — 99214 OFFICE O/P EST MOD 30 MIN: CPT | Mod: 25 | Performed by: FAMILY MEDICINE

## 2024-04-05 PROCEDURE — 3078F DIAST BP <80 MM HG: CPT | Performed by: FAMILY MEDICINE

## 2024-04-05 PROCEDURE — 20553 NJX 1/MLT TRIGGER POINTS 3/>: CPT | Performed by: FAMILY MEDICINE

## 2024-04-05 RX ORDER — CYCLOBENZAPRINE HCL 5 MG
5 TABLET ORAL 3 TIMES DAILY PRN
Qty: 90 TABLET | Refills: 3 | Status: SHIPPED | OUTPATIENT
Start: 2024-04-05 | End: 2024-04-26

## 2024-04-05 RX ADMIN — Medication 20 ML: at 16:42

## 2024-04-05 ASSESSMENT — FIBROSIS 4 INDEX: FIB4 SCORE: 1.17

## 2024-04-05 NOTE — ASSESSMENT & PLAN NOTE
Migraines for 2 years, just started spontaneously    She has a migraine most days, sometimes wakes her at night.   Light sensitivity present, no nausea or vomiting.  No vision changes or vision loss. No hearing loss, no falls.   Cold packs on her head and neck numb the pain briefly.   Has tried imitrex, gabapentin and tegretol which did not help. Not improved with tylenol or ibuprofen  She does not have vision loss or vision changes or any focal neurologic deficits.     Muscle relaxers have helped some in the past. Ice and her  massaging her neck helps.     She has cervical spine surgery 5 or 6 years ago and it had helped with neck pain due to stenosis      Trigger point injections done today:   Skin cleaned with alcohol and 1% lidocaine without epinephrine injected into muscles at the base of occiput bilaterally and paraspinal muscles bilaterally after aspiration with no blood.

## 2024-04-09 NOTE — PROGRESS NOTES
Subjective:   Herlinda Gill is a 68 y.o. female here today for evaluation and management of:     Intractable migraine with aura without status migrainosus  Migraines for 2 years, just started spontaneously    She has a migraine most days, sometimes wakes her at night.   Light sensitivity present, no nausea or vomiting.  No vision changes or vision loss. No hearing loss, no falls.   Cold packs on her head and neck numb the pain briefly.   Has tried imitrex, gabapentin and tegretol which did not help. Not improved with tylenol or ibuprofen  She does not have vision loss or vision changes or any focal neurologic deficits.     Muscle relaxers have helped some in the past. Ice and her  massaging her neck helps.     She has cervical spine surgery 5 or 6 years ago and it had helped with neck pain due to stenosis      Trigger point injections done today:   Skin cleaned with alcohol and 1% lidocaine without epinephrine injected into muscles at the base of occiput bilaterally and paraspinal muscles bilaterally after aspiration with no blood.              Current medicines (including changes today)  Current Outpatient Medications   Medication Sig Dispense Refill    cyclobenzaprine (FLEXERIL) 5 mg tablet Take 1 Tablet by mouth 3 times a day as needed for Moderate Pain. 90 Tablet 3    albuterol (PROAIR HFA) 108 (90 Base) MCG/ACT Aero Soln inhalation aerosol Inhale 2 Puffs every four hours as needed for Shortness of Breath. And 15 min before exercise; max 4x daily 1 Each 11    ipratropium-albuterol (DUONEB) 0.5-2.5 (3) MG/3ML nebulizer solution Take 3 mL by nebulization every four hours as needed for Shortness of Breath. 360 Each 3    losartan (COZAAR) 50 MG Tab Take 1 Tablet by mouth every day.      Rimegepant Sulfate (NURTEC) 75 MG TABLET DISPERSIBLE Take 1 Tablet by mouth every day. For migraines 30 Tablet 5    trazodone (DESYREL) 300 MG tablet Take 300 mg by mouth at bedtime as needed.      Home Care Oxygen 3 lpm       "TRULICITY 1.5 MG/0.5ML Solution Pen-injector INJECT 1 SYRINGE SUBCUTANEOUSLY ONCE WEEKLY      metoprolol (TOPROL-XL) 200 MG XL tablet Take 1 Tablet by mouth every day.      lubiprostone (AMITIZA) 24 MCG capsule Take 1 Capsule by mouth 2 times a day with meals. 60 Capsule 0    NON SPECIFIED Nebulizer 1 Each 0     No current facility-administered medications for this visit.     She  has a past medical history of Allergic rhinitis, Anemia, Anxiety, Arthritis, Atrial fibrillation ([facility]), Back pain, Bipolar affective disorder ([facility]), Breath shortness, Bronchitis, Cataract, Chest tightness, Chickenpox, Constipation, COPD (chronic obstructive pulmonary disease) ([facility]), Daytime sleepiness, Depression, Diabetes ([facility]), Dyspnea on exertion (10/25/2020), GERD (gastroesophageal reflux disease), Hyperlipidemia, Hypertension, Hypoxia (12/31/2018), IBD (inflammatory bowel disease), Indigestion, Lumbar disc disease, Menopause, Migraine, Morning headache, Murmur (2/23/2021), Nasal drainage, Obesity, Pneumonia, Primary osteoarthritis of right hip (4/26/2021), Pulmonary emphysema ([facility]), Shortness of breath, Sinusitis, Sleep apnea, Thyroid disease, Wears glasses, and Wheezing.    She has no past medical history of Addisons disease ([facility]), Adrenal disorder ([facility]), Asthma, Blood transfusion without reported diagnosis, Breast cancer ([facility]), Cancer (HCC), CHF (congestive heart failure) ([facility]), Clotting disorder (HCC), Cushings syndrome ([facility]), Glaucoma, Heart attack ([facility]), HIV (human immunodeficiency virus infection) ([facility]), Kidney disease, Seizure ([facility]), Stroke ([facility]), or Substance abuse ([facility]).    ROS  No chest pain, no shortness of breath, no abdominal pain       Objective:     /72   Pulse 64   Temp (!) 35.8 °C (96.4 °F) (Temporal)   Resp 16   Ht 1.626 m (5' 4\")   Wt 118 kg (261 lb)   SpO2 97%  Body mass index is 44.8 kg/m².   Physical Exam:  Constitutional: Alert, no distress.  Skin: Warm, dry, good turgor, no rashes in visible " areas.  Eye: Equal, round and reactive, conjunctiva clear, lids normal.  ENMT: Lips without lesions, good dentition, oropharynx clear.  Neck: Trachea midline, no masses, no thyromegaly. No cervical or supraclavicular lymphadenopathy  Respiratory: Unlabored respiratory effort, lungs clear to auscultation, no wheezes, no ronchi.  Cardiovascular: Normal S1, S2, no murmur, no edema.  Abdomen: Soft, non-tender, no masses, no hepatosplenomegaly.  Psych: Alert and oriented x3, normal affect and mood.    Assessment and Plan:   The following treatment plan was discussed    1. Intractable migraine with aura without status migrainosus    2. Screening for colorectal cancer  - Referral to GI for Colonoscopy    3. Encounter for screening mammogram for breast cancer  - MA-SCREENING MAMMO BILAT W/TOMOSYNTHESIS W/CAD; Future    4. Cervical radiculopathy  - Referral to Physical Therapy    5. Skin lesions  - Referral to Dermatology    6. Skin cancer screening  - Referral to Dermatology    7. Prediabetes  - Comp Metabolic Panel; Future  - HEMOGLOBIN A1C; Future    8. Hypertriglyceridemia  - Lipid Profile; Future    Other orders  - cyclobenzaprine (FLEXERIL) 5 mg tablet; Take 1 Tablet by mouth 3 times a day as needed for Moderate Pain.  Dispense: 90 Tablet; Refill: 3  - lidocaine (Xylocaine) 1 % injection 20 mL      Followup: Return in about 3 months (around 7/5/2024) for 2 wk injections trigger point. 3 month follow up Annual Wellness.

## 2024-04-26 ENCOUNTER — OFFICE VISIT (OUTPATIENT)
Dept: MEDICAL GROUP | Facility: PHYSICIAN GROUP | Age: 69
End: 2024-04-26
Payer: MEDICARE

## 2024-04-26 VITALS
BODY MASS INDEX: 44.73 KG/M2 | RESPIRATION RATE: 20 BRPM | DIASTOLIC BLOOD PRESSURE: 68 MMHG | HEIGHT: 64 IN | WEIGHT: 262 LBS | TEMPERATURE: 97.8 F | HEART RATE: 63 BPM | SYSTOLIC BLOOD PRESSURE: 124 MMHG | OXYGEN SATURATION: 98 %

## 2024-04-26 DIAGNOSIS — J44.9 CHRONIC OBSTRUCTIVE PULMONARY DISEASE, UNSPECIFIED COPD TYPE (HCC): ICD-10-CM

## 2024-04-26 DIAGNOSIS — G43.119 INTRACTABLE MIGRAINE WITH AURA WITHOUT STATUS MIGRAINOSUS: ICD-10-CM

## 2024-04-26 PROCEDURE — 3078F DIAST BP <80 MM HG: CPT | Performed by: FAMILY MEDICINE

## 2024-04-26 PROCEDURE — 99214 OFFICE O/P EST MOD 30 MIN: CPT | Performed by: FAMILY MEDICINE

## 2024-04-26 PROCEDURE — 3074F SYST BP LT 130 MM HG: CPT | Performed by: FAMILY MEDICINE

## 2024-04-26 RX ORDER — METHOCARBAMOL 500 MG/1
500 TABLET, FILM COATED ORAL 2 TIMES DAILY PRN
Qty: 60 TABLET | Refills: 5 | Status: SHIPPED | OUTPATIENT
Start: 2024-04-26

## 2024-04-26 RX ORDER — MEMANTINE HYDROCHLORIDE 10 MG/1
TABLET ORAL
COMMUNITY
Start: 2024-01-02 | End: 2024-04-26

## 2024-04-26 RX ORDER — IPRATROPIUM BROMIDE AND ALBUTEROL 20; 100 UG/1; UG/1
SPRAY, METERED RESPIRATORY (INHALATION)
COMMUNITY
End: 2024-04-26

## 2024-04-26 RX ORDER — RISPERIDONE 4 MG/1
4 TABLET ORAL
COMMUNITY
Start: 2024-04-14 | End: 2024-04-26

## 2024-04-26 RX ORDER — RIVAROXABAN 20 MG/1
1 TABLET, FILM COATED ORAL
COMMUNITY
Start: 2024-02-22

## 2024-04-26 RX ORDER — ERENUMAB-AOOE 140 MG/ML
140 INJECTION, SOLUTION SUBCUTANEOUS
COMMUNITY
Start: 2024-01-02 | End: 2024-04-26

## 2024-04-26 RX ORDER — MELOXICAM 15 MG/1
TABLET ORAL
COMMUNITY
Start: 2024-04-12 | End: 2024-04-26

## 2024-04-26 ASSESSMENT — FIBROSIS 4 INDEX: FIB4 SCORE: 1.17

## 2024-04-26 NOTE — PROGRESS NOTES
Subjective:   Herlinda Gill is a 68 y.o. female here today for evaluation and management of:     Intractable migraine with aura without status migrainosus  Migraines for 2 years, just started spontaneously    She has a migraine most days, sometimes wakes her at night.   Light sensitivity present, no nausea or vomiting.  No vision changes or vision loss. No hearing loss, no falls.   Cold packs on her head and neck numb the pain briefly.   Has tried imitrex, gabapentin and tegretol which did not help. Not improved with tylenol or ibuprofen  She does not have vision loss or vision changes or any focal neurologic deficits.     Muscle relaxers have helped some in the past. Ice and her  massaging her neck helps.     She has cervical spine surgery 5 or 6 years ago and it had helped with neck pain due to stenosis  She was put on nurtec and aimovig but these have not helped her migraines,   Trigger point injections did not help.   Migraines are now lasting longer.    massaging the back of her head and neck have helped considerably with her headaches which she feels are the main trigger for her migraines.   She wason flexeril 5 mg TID but it did not work  Rx for soma provided.     Ref to neurology provided for further treatment, possibly botox or other regimens.     Ct head done sept 2023  FINDINGS:    No acute intracranial hemorrhage, midline shift, or mass effect is identified.  The ventricular system and basilar cisterns are age-appropriate in size. No  abnormal extra-axial fluid collections are noted. The gray/white junction is  well differentiated.    The paranasal sinuses are unremarkable. The mastoid air cells are well  pneumatized bilaterally. The soft tissues of the scalp and orbits are  unremarkable. The calvarium is intact. There is no evidence for depressed skull  fracture. Hyperostosis frontalis is evident.    Atherosclerotic calcifications of the intracavernous internal carotid and  vertebral  arteries are present.    TOPOGRAM: No additional findings provided.  Exam End: 09/08/23  4:15 PM Last Resulted: 09/08/23  4:33 PM   Received From: [facility]               Current medicines (including changes today)  Current Outpatient Medications   Medication Sig Dispense Refill    XARELTO 20 MG Tab tablet Take 1 Tablet by mouth with dinner.      methocarbamol (ROBAXIN) 500 MG Tab Take 1 Tablet by mouth 2 times a day as needed (headaches, migraines). 60 Tablet 5    albuterol (PROAIR HFA) 108 (90 Base) MCG/ACT Aero Soln inhalation aerosol Inhale 2 Puffs every four hours as needed for Shortness of Breath. And 15 min before exercise; max 4x daily 1 Each 11    ipratropium-albuterol (DUONEB) 0.5-2.5 (3) MG/3ML nebulizer solution Take 3 mL by nebulization every four hours as needed for Shortness of Breath. 360 Each 3    losartan (COZAAR) 50 MG Tab Take 1 Tablet by mouth every day.      Rimegepant Sulfate (NURTEC) 75 MG TABLET DISPERSIBLE Take 1 Tablet by mouth every day. For migraines 30 Tablet 5    trazodone (DESYREL) 300 MG tablet Take 300 mg by mouth at bedtime as needed.      Home Care Oxygen 3 lpm      metoprolol (TOPROL-XL) 200 MG XL tablet Take 1 Tablet by mouth every day.      lubiprostone (AMITIZA) 24 MCG capsule Take 1 Capsule by mouth 2 times a day with meals. 60 Capsule 0    NON SPECIFIED Nebulizer 1 Each 0     No current facility-administered medications for this visit.     She  has a past medical history of Allergic rhinitis, Anemia, Anxiety, Arthritis, Atrial fibrillation ([facility]), Back pain, Bipolar affective disorder ([facility]), Breath shortness, Bronchitis, Cataract, Chest tightness, Chickenpox, Constipation, COPD (chronic obstructive pulmonary disease) ([facility]), Daytime sleepiness, Depression, Diabetes ([facility]), Dyspnea on exertion (10/25/2020), GERD (gastroesophageal reflux disease), Hyperlipidemia, Hypertension, Hypoxia (12/31/2018), IBD (inflammatory bowel disease), Indigestion, Lumbar disc  "disease, Menopause, Migraine, Morning headache, Murmur (2/23/2021), Nasal drainage, Obesity, Pneumonia, Primary osteoarthritis of right hip (4/26/2021), Pulmonary emphysema (HCC), Shortness of breath, Sinusitis, Sleep apnea, Thyroid disease, Wears glasses, and Wheezing.    She has no past medical history of Addisons disease (HCC), Adrenal disorder (HCC), Asthma, Blood transfusion without reported diagnosis, Breast cancer (HCC), Cancer (HCC), CHF (congestive heart failure) (HCC), Clotting disorder (HCC), Cushings syndrome (HCC), Glaucoma, Heart attack (HCC), HIV (human immunodeficiency virus infection) (HCC), Kidney disease, Seizure (HCC), Stroke (HCC), or Substance abuse (HCC).    ROS  No chest pain, no shortness of breath, no abdominal pain       Objective:     /68   Pulse 63   Temp 36.6 °C (97.8 °F) (Temporal)   Resp 20   Ht 1.626 m (5' 4\")   Wt 119 kg (262 lb)   SpO2 98%  Body mass index is 44.97 kg/m².   Physical Exam:on supplemental oxygen  Constitutional: Alert, no distress, well-groomed.  Skin: No rashes in visible areas.  Eye: Round. Conjunctiva clear, lids normal. No icterus.   ENMT: Lips pink without lesions, good dentition, moist mucous membranes. Phonation normal.  Neck: No masses, no thyromegaly. Moves freely without pain.  Respiratory: Unlabored respiratory effort, no cough or audible wheeze  Psych: Alert and oriented x3, normal affect and mood.      Assessment and Plan:   The following treatment plan was discussed    1. Chronic obstructive pulmonary disease, unspecified COPD type (HCC)  - PULMONARY FUNCTION TESTS -Test requested: Complete Pulmonary Function Test; Future    2. Intractable migraine with aura without status migrainosus  - Referral to Neurology    Other orders  - XARELTO 20 MG Tab tablet; Take 1 Tablet by mouth with dinner.  - methocarbamol (ROBAXIN) 500 MG Tab; Take 1 Tablet by mouth 2 times a day as needed (headaches, migraines).  Dispense: 60 Tablet; Refill: " 5      Followup: Return for As Scheduled.

## 2024-04-26 NOTE — ASSESSMENT & PLAN NOTE
Migraines for 2 years, just started spontaneously    She has a migraine most days, sometimes wakes her at night.   Light sensitivity present, no nausea or vomiting.  No vision changes or vision loss. No hearing loss, no falls.   Cold packs on her head and neck numb the pain briefly.   Has tried imitrex, gabapentin and tegretol which did not help. Not improved with tylenol or ibuprofen  She does not have vision loss or vision changes or any focal neurologic deficits.     Muscle relaxers have helped some in the past. Ice and her  massaging her neck helps.     She has cervical spine surgery 5 or 6 years ago and it had helped with neck pain due to stenosis  She was put on nurtec and aimovig but these have not helped her migraines,   Trigger point injections did not help.   Migraines are now lasting longer.    massaging the back of her head and neck have helped considerably with her headaches which she feels are the main trigger for her migraines.   She wason flexeril 5 mg TID but it did not work  Rx for soma provided.     Ref to neurology provided for further treatment, possibly botox or other regimens.     Ct head done sept 2023  FINDINGS:    No acute intracranial hemorrhage, midline shift, or mass effect is identified.  The ventricular system and basilar cisterns are age-appropriate in size. No  abnormal extra-axial fluid collections are noted. The gray/white junction is  well differentiated.    The paranasal sinuses are unremarkable. The mastoid air cells are well  pneumatized bilaterally. The soft tissues of the scalp and orbits are  unremarkable. The calvarium is intact. There is no evidence for depressed skull  fracture. Hyperostosis frontalis is evident.    Atherosclerotic calcifications of the intracavernous internal carotid and  vertebral arteries are present.    TOPOGRAM: No additional findings provided.  Exam End: 09/08/23  4:15 PM Last Resulted: 09/08/23  4:33 PM   Received From: [facility]  [facility]

## 2024-05-03 ENCOUNTER — TELEPHONE (OUTPATIENT)
Dept: HEALTH INFORMATION MANAGEMENT | Facility: OTHER | Age: 69
End: 2024-05-03
Payer: MEDICARE

## 2024-05-23 ENCOUNTER — NON-PROVIDER VISIT (OUTPATIENT)
Dept: SLEEP MEDICINE | Facility: MEDICAL CENTER | Age: 69
End: 2024-05-23
Attending: FAMILY MEDICINE
Payer: MEDICARE

## 2024-05-23 VITALS — HEIGHT: 64 IN | WEIGHT: 259 LBS | BODY MASS INDEX: 44.22 KG/M2

## 2024-05-23 DIAGNOSIS — J44.9 CHRONIC OBSTRUCTIVE PULMONARY DISEASE, UNSPECIFIED COPD TYPE (HCC): ICD-10-CM

## 2024-05-23 PROCEDURE — 94010 BREATHING CAPACITY TEST: CPT | Mod: 26 | Performed by: INTERNAL MEDICINE

## 2024-05-23 PROCEDURE — 94729 DIFFUSING CAPACITY: CPT | Mod: 26 | Performed by: INTERNAL MEDICINE

## 2024-05-23 PROCEDURE — 94726 PLETHYSMOGRAPHY LUNG VOLUMES: CPT | Mod: 26 | Performed by: INTERNAL MEDICINE

## 2024-05-23 ASSESSMENT — FIBROSIS 4 INDEX: FIB4 SCORE: 1.17

## 2024-05-23 ASSESSMENT — PULMONARY FUNCTION TESTS
FEV1/FVC_PERCENT_PREDICTED: 92
FEV1_PERCENT_PREDICTED: 57
FEV1/FVC_PERCENT_PREDICTED: 77
FEV1_PREDICTED: 2.2
FEV1/FVC_PREDICTED: 78
FVC_PREDICTED: 2.84
FEV1_LLN: 1.83
FEV1: 1.27
FEV1/FVC_PERCENT_LLN: 65
FEV1/FVC: 72
FVC_PERCENT_PREDICTED: 62
FVC_LLN: 2.37
FEV1/FVC: 72
FVC: 1.77
FEV1/FVC_PERCENT_PREDICTED: 91

## 2024-05-23 NOTE — PROCEDURES
Technician: Shanna Almanzar RRT, CPFT  Good patient effort & cooperation. Patient took Combient about 2.5 hours prior to testing, NO POST FVC DONE. Due to patient being clautrophobic we did testing outside of [place], patient was able to perform PLETH but did minimal maneuvers.  The results of this test meet the ATS/ERS standards for acceptability and repeatability.  Test was performed on the [employer] Body Plethysmograph-Elite DX system.  Predicted equations for Spirometry are GLI-2012, ITS for lung volumes, and GLI- 2017 for DLCO.  The DLCO was uncorrected for Hgb.   DLCO was performed during dilation period.    Interpretation:  There is a proportional reduction in the forced volumes on spirometry.  Bronchodilator testing was not performed.    Lung volumes demonstrate a reduced expiratory reserve volume of 25% predicted, likely attributable to reported BMI of 44.5.  Otherwise lung volumes are preserved.    The reduced values on spirometry with normal total lung capacity is a nonspecific PFT pattern that can be seen in obesity.    Diffusion capacity is preserved.    Flow volume loop does suggest some scooping of the expiratory limb suggestive of obstruction, which is not corroborated on spirometry.  Clinically correlate.    FEV1 has declined from 1.60 L in 2018 to 1.27 L, or 57% predicted.    IArnold M.D. am the author of this note (PFT interpretation).  __________  Arnold England MD  Pulmonary and Critical Care Medicine  [facility]

## 2024-06-11 NOTE — PROGRESS NOTES
[facility]  GENERAL NEUROLOGY  NEW PATIENT VISIT    Referral source: Dr. Nancy Guidry    CC: Intractable migraine with aura without status migrainosus    HISTORY OF ILLNESS:  Herlinda Gill is a 68 y.o. woman, who presents with her , with a history most notable for migraine. She reports having migraines in her teens and her migraines dulled  until her children were young then the migraines came back.  Today, Herlinda provided the following history:    Migraines description:   Occasionally will  start with neck pain bilateral side. She reports approximately  5-10 minutes she will have a migraine. Migraine will start in the bilateral neck and radiate across the skull to the bilateral frontal region. Quality of migraine is described as pulsation. Intensity of migraine without medication 7-10/10. Aspirin 7-10/10. Migraine can last 12 to 24 hours. Post migraine hangover of muscle fatigue for a day. Frequency of migraine is every other day. Associated symptoms: noise sensitivity, temperature sensitivity, blurred vision. Trigger: stress, food smell. Can trigger a migraine with position change. Denies being able to trigger a migraine with sudden position change, bending, laughing, crying, sneezing, or coughing. Denies double vision. Migraine will sometimes occur in the morning or throughout the day.  She does report a history of sleep apnea and has a CPAP device she wears at night.    The following is a summary of headache symptoms, presented in my standard format:    Family History: mom  Age at onset (years): teens  Location: see above   Radiation: see above   Frequency: see above   Duration: see above   Headache Days/Month: February 14-15/30, March 14-15/30, April 14-15/30, May 14-15/30, June variable  Quality: see above   Intensity: see above   Aura: see above   Photophobia/Phonophobia/Nausea/Vomiting: no, yes, no, no  Provoked by Physical Activity?: yes  Triggers: see above   Associated Symptoms: see above    Autonomic Signs (such as ptosis, miosis, conjunctival injection, rhinorrhea, increased lacrimation): no  Head Trauma: Concussion multiple   Association with Menses: no  ED Visits: no  Hospitalizations: no  Missed Work Days (retired): incapacitated 9 days a month   Sleep (hours/night): 3-7 hours   Caffeine Intake: 2+ cups of coffee a day   Hydration: yes  Nutrition: skips meals   Exercise: no- does not trigger a migraine   Analgesic Overuse: Tylenol 3 tablets 2-3 times a week    Current Medication Regimen:  - Metoprolol- for a different modality- not effective for migraine    Medications Tried: Response  Preventive:  - Aimovig  - carbamazepine  - gabapentin  - Duloxetine  - Indomethacin    Rescue:  -     Medications Not Tried:  - Triptan, Reyvow, Trudhesa due to atherosclerotic issues    MEDICAL AND SURGICAL HISTORY:  Past Medical History:   Diagnosis Date    Allergic rhinitis     codeine, sulfa, depakote, seasonal    Anemia     Anxiety     Arthritis     Atrial fibrillation (HCC)     intermittent    Back pain     Bipolar affective disorder (HCC)     Breath shortness     Bronchitis     Cataract     removed bilateral 2016    Chest tightness     Chickenpox     Constipation     COPD (chronic obstructive pulmonary disease) (HCC)     Daytime sleepiness     Depression     Diabetes (HCC)     pre    Dyspnea on exertion 10/25/2020    Last Assessment & Plan:  Formatting of this note might be different from the original. Lexiscan MPI 2/20/2020: EF 55% NMWA, elevated TID 1.45, SDS 3, NDI or MI. Patient has had coronary angiography which reveals normal coronaries.  False positive stress testing.  Holter monitoring 11/27/19: NSR ave 75, min 54/max 120, no Afib, rare PAC, no symptoms.  GDMT and risk factor modification.    GERD (gastroesophageal reflux disease)     Hyperlipidemia     Hypertension     Hypoxia 12/31/2018    IBD (inflammatory bowel disease)     constipation    Indigestion     Lumbar disc disease     Menopause      Migraine     Morning headache     Murmur 2/23/2021    Nasal drainage     Obesity     Pneumonia     Primary osteoarthritis of right hip 4/26/2021    Pulmonary emphysema (HCC)     Shortness of breath     Sinusitis     Sleep apnea     CPAP and O2 at night    Thyroid disease     Wears glasses     Wheezing      Past Surgical History:   Procedure Laterality Date    ARTHROPLASTY Right 05/2021    CERVICAL DISK AND FUSION ANTERIOR  12/29/2011    Performed by MICKEY BOLIVAR at SURGERY [facility] ORS    ABDOMINAL EXPLORATION      EYE SURGERY      Cataracts X2    FUSION, SPINE, LUMBAR, PLIF      HYSTERECTOMY LAPAROSCOPY      HYSTERECTOMY, TOTAL ABDOMINAL      LAMINOTOMY      ME REMV 2ND CATARACT,CORN-SCLER SECTN       MEDICATIONS:  Current Outpatient Medications   Medication Sig    XARELTO 20 MG Tab tablet Take 1 Tablet by mouth with dinner.    methocarbamol (ROBAXIN) 500 MG Tab Take 1 Tablet by mouth 2 times a day as needed (headaches, migraines).    albuterol (PROAIR HFA) 108 (90 Base) MCG/ACT Aero Soln inhalation aerosol Inhale 2 Puffs every four hours as needed for Shortness of Breath. And 15 min before exercise; max 4x daily    ipratropium-albuterol (DUONEB) 0.5-2.5 (3) MG/3ML nebulizer solution Take 3 mL by nebulization every four hours as needed for Shortness of Breath.    losartan (COZAAR) 50 MG Tab Take 1 Tablet by mouth every day.    Rimegepant Sulfate (NURTEC) 75 MG TABLET DISPERSIBLE Take 1 Tablet by mouth every day. For migraines    trazodone (DESYREL) 300 MG tablet Take 300 mg by mouth at bedtime as needed.    Home Care Oxygen 3 lpm    metoprolol (TOPROL-XL) 200 MG XL tablet Take 1 Tablet by mouth every day.    lubiprostone (AMITIZA) 24 MCG capsule Take 1 Capsule by mouth 2 times a day with meals.    NON SPECIFIED Nebulizer     SOCIAL HISTORY:  Social History     Tobacco Use    Smoking status: Former     Current packs/day: 0.00     Average packs/day: 2.0 packs/day for 40.0 years (80.0 ttl pk-yrs)     Types:  "Cigarettes     Start date: 1977     Quit date: 2017     Years since quittin.5     Passive exposure: Past    Smokeless tobacco: Never    Tobacco comments:     Continued abstinence   Substance Use Topics    Alcohol use: No     Social History     Social History Narrative    Not on file     FAMILY HISTORY:  Family History   Problem Relation Age of Onset    Heart Disease Mother     Parkinson's Disease Mother     Asthma Mother     Hyperlipidemia Mother     Hypertension Mother     Alcohol abuse Mother     Cancer Father         lung    Alcohol abuse Father     GI Disease Sister         cirrhosis    Alcohol abuse Sister     Other Brother         Car accident    Lung Disease Sister         Emphysema    Alzheimer's Disease Sister     Cancer Sister         lung    Psychiatric Illness Sister        Ambulatory Vitals  Encounter Vitals  Temperature: (!) 35.7 °C (96.2 °F)  Temp src: Temporal  Blood Pressure : 138/80  Pulse: 73  Respiration: 18  Pulse Oximetry: 95 % (3 liters O2)  Weight: 120 kg (264 lb 8.8 oz)  Height: 162.6 cm (5' 4\")  BMI (Calculated): 45.41     REVIEW OF SYSTEMS:  A ROS was completed.  Pertinent positives and negatives were included in the HPI, above.  All other systems were reviewed and are negative.    PHYSICAL EXAM:  General/Medical:  Herlinda presents clean and well-dressed.  She does wear oxygen which is her baseline.  She does not appear in any acute distress at this time.  She does endorse the beginnings of a migraine.  She has limited range of motion of her neck due to neck tension.  She has no malar rash.  She reports no jaw claudication or jaw pain.  She reports no allodynia.  She has no upper or lower extremity edema.  She has palpable radial pulses.  Her capillary refill is prolonged in the upper extremities.  Auscultation of her heart revealed S1 and S2 with a murmur and no other abnormal sounds.  Vital signs are listed above and are within normal limits.    Neuro:  MENTAL STATUS: awake and " alert; no deficits of speech or language; oriented to person, place, and time; affect was appropriate to situation    CRANIAL NERVES:    II: acuity: J2/J3, fields: intact to confrontation, pupils: 3/3 to 2/2 without a relative afferent pupillary defect, discs: sharp    III/IV/VI: versions: intact without nystagmus    V: facial sensation: symmetric to light touch    VII: facial expression: symmetric    VIII: hearing: intact to finger rub    IX/X: palate: elevates symmetrically    XI: shoulder shrug: symmetric    XII: tongue: midline    MOTOR:  - bulk: normal throughout  - tone: normal throughout  Upper Extremity Strength  (R/L)    4+/4+   Elbow flexion 4+/4+   Elbow extension 4+/4+   Shoulder abduction 4+/4+     Lower Extremity Strength  (R/L)   Hip flexion 4+/4+   Knee extension 4+/4+   Knee flexion 4+/4+   Ankle plantarflexion 4+/4+   Ankle dorsiflexion 4+/4+     - pronator drift: absent  - abnormal movements: none    SENSATION:  - light touch: Intact  - vibration: Intact  - temperature: Intact  - pinprick: NT  - proprioception: NT  - Romberg: absent    COORDINATION:  - finger to nose: normal, no ataxia on exam  - finger tapping: rapid and accurate, bilaterally  - foot tapping: Slow with low amplitude, bilaterally    REFLEXES:  Reflex Right Left   BR 1+ 1+   Biceps 1+ 1+   Triceps 1+ 1+   Patellae Absent Absent   Achilles Absent Absent   Toes  NT NT     GAIT:  -Wide base with limited arm swing  - heel-walk: Intact  - toe-walk: Intact  - tandem gait: Unable to perform unsteady    REVIEW OF IMAGING STUDIES: I reviewed the following studies:  MRI Brain:  N/A    REVIEW OF LABORATORY STUDIES:  No current pertinent labs    ASSESSMENT& PLAN:  1. Migraine without aura and without status migrainosus, not intractable  Herlinda and her  present to establish with a neurology provider for continued management of her migraines.  She has tried and failed multiple preventative and rescue medication listed above.  She  reports having migraines 14 to 15 days a month of which she is incapacitated for greater than 9 days a month for greater than 4 hours.  She does meet requirements for preventative migraine medication.  Her neurological exam revealed some upper and lower extremity weakness, diminished and absent reflexes, and unable to perform tandem gait due to being unsteady.  She had no other concerning neurological findings.  She has not had a brain MRI she did have a CT in 2023 which was normal.  An MRI was offered but she deferred for now.  For migraine prevention we discussed different medications including: Verapamil, Nurtec, Qulipta, acetazolamide, Botox, and Topamax.  Benita and her  would like to review medications prior to deciding on a preventative medication.  We also discussed rescue medications.  She has not tried any rescue medications that I have access to.  However, she does have a history of atrial fibrillation and atherosclerosis so she is limited to: Nurtec,'s Avapro, Ubrelvy, or Elyxyb.  Herlinda and her  like to review her rescue medications before deciding on a medication.  We discussed lifestyle changes such as: Adequate sleep, staying hydrated, not skipping meals, stress reduction, not overusing over-the-counter analgesics.  We also discussed over-the-counter supplements for migraine prevention listed below.  As soon as Benita decides on a rescue and a preventative medication she will notify me via [employer].  2 months after starting a preventative medication she will follow-up with me to discuss her medication regimen and its effect on her migraines.  She can contact me via [employer] with any updates, concerns, or questions.  Otherwise I will see her back in 2 months after initiation of her preventative medication.    Try supplementing:  - magnesium: 400-600 mg once or 200-300 mg twice daily  - riboflavin (vitamin B2): 400 mg once daily  - coenzyme Q10: 300 mg daily    BILLING DOCUMENTATION:   I spent 75  minutes in patient care. This includes time with chart review, pre-charting, records review, discussions with other healthcare providers, and documentation. This also includes face-to-face time with the patient for: exam review, discussion and treatment planning.     Sulema Mulligan MSN APRN-CNP  [facility] [place]

## 2024-06-13 ENCOUNTER — OFFICE VISIT (OUTPATIENT)
Dept: NEUROLOGY | Facility: MEDICAL CENTER | Age: 69
End: 2024-06-13
Payer: MEDICARE

## 2024-06-13 VITALS
OXYGEN SATURATION: 95 % | RESPIRATION RATE: 18 BRPM | TEMPERATURE: 96.2 F | HEART RATE: 73 BPM | SYSTOLIC BLOOD PRESSURE: 138 MMHG | DIASTOLIC BLOOD PRESSURE: 80 MMHG | WEIGHT: 264.55 LBS | BODY MASS INDEX: 45.17 KG/M2 | HEIGHT: 64 IN

## 2024-06-13 DIAGNOSIS — G43.009 MIGRAINE WITHOUT AURA AND WITHOUT STATUS MIGRAINOSUS, NOT INTRACTABLE: ICD-10-CM

## 2024-06-13 PROCEDURE — G2212 PROLONG OUTPT/OFFICE VIS: HCPCS

## 2024-06-13 PROCEDURE — 99205 OFFICE O/P NEW HI 60 MIN: CPT

## 2024-06-13 PROCEDURE — 3075F SYST BP GE 130 - 139MM HG: CPT

## 2024-06-13 PROCEDURE — 99212 OFFICE O/P EST SF 10 MIN: CPT

## 2024-06-13 PROCEDURE — 3079F DIAST BP 80-89 MM HG: CPT

## 2024-06-13 ASSESSMENT — FIBROSIS 4 INDEX: FIB4 SCORE: 1.17

## 2024-06-13 NOTE — PATIENT INSTRUCTIONS
Try supplementing:  - magnesium: 400 mg   - riboflavin (vitamin B2): 400 mg once daily  - coenzyme Q10: 300 mg daily     Prevention:  Verapamil  Nurtec  Qulipta  Acetazolamide  Botox  Topamax     Rescue:  Nurtec  Ubrelvy  Elyxyb

## 2024-07-26 ENCOUNTER — APPOINTMENT (OUTPATIENT)
Dept: MEDICAL GROUP | Facility: PHYSICIAN GROUP | Age: 69
End: 2024-07-26
Payer: MEDICARE

## 2024-08-14 ENCOUNTER — OFFICE VISIT (OUTPATIENT)
Dept: MEDICAL GROUP | Facility: PHYSICIAN GROUP | Age: 69
End: 2024-08-14
Payer: MEDICARE

## 2024-08-14 VITALS
BODY MASS INDEX: 44.39 KG/M2 | DIASTOLIC BLOOD PRESSURE: 78 MMHG | TEMPERATURE: 97.6 F | RESPIRATION RATE: 16 BRPM | SYSTOLIC BLOOD PRESSURE: 122 MMHG | OXYGEN SATURATION: 97 % | WEIGHT: 260 LBS | HEIGHT: 64 IN | HEART RATE: 60 BPM

## 2024-08-14 DIAGNOSIS — Z12.83 SKIN CANCER SCREENING: ICD-10-CM

## 2024-08-14 DIAGNOSIS — E66.01 MORBID OBESITY WITH BMI OF 40.0-44.9, ADULT (HCC): ICD-10-CM

## 2024-08-14 DIAGNOSIS — Z87.891 HISTORY OF TOBACCO USE DISORDER: ICD-10-CM

## 2024-08-14 PROCEDURE — 3078F DIAST BP <80 MM HG: CPT | Performed by: FAMILY MEDICINE

## 2024-08-14 PROCEDURE — 99214 OFFICE O/P EST MOD 30 MIN: CPT | Performed by: FAMILY MEDICINE

## 2024-08-14 PROCEDURE — 3074F SYST BP LT 130 MM HG: CPT | Performed by: FAMILY MEDICINE

## 2024-08-14 RX ORDER — TOPIRAMATE 50 MG/1
50 TABLET, FILM COATED ORAL 2 TIMES DAILY
Qty: 180 TABLET | Refills: 3 | Status: SHIPPED | OUTPATIENT
Start: 2024-08-14

## 2024-08-14 ASSESSMENT — FIBROSIS 4 INDEX: FIB4 SCORE: 1.17

## 2024-08-14 NOTE — ASSESSMENT & PLAN NOTE
Patient has lost weight 40 lbs with trulicity in the past  Weight loss recommended medically for her as she has MARTITA on cpap, afibb, migraines, dyslipidemia, GERD, elevated Liver enzymes, COPD, degenerative disc disease and arthritis.     Main carb in diet is rice and potatoes.   She recently started going to the gym.   I advised decreased any sugars, refines carbohydrates, increase lean protein, vegetables  Drink 64 oz of water, add decaf teas for stress relief    Discusses topamax for binge eating as well as to help as a maintenance for migraine prophylaxis as discussed with her at her neurologist visit.     She would like to start this.   Rx for topamax 50 mg bid provided.     Ref for skin cancer screening provided  She has ref for screening colonoscopy and screening mammogram in place. Will print these so she can call and schedule.   Follow up in 3 months.

## 2024-08-14 NOTE — PROGRESS NOTES
Subjective:   Herlinda Gill is a 68 y.o. female here today for evaluation and management of:     Morbid obesity with BMI of 40.0-44.9, adult (HCC)  Patient has lost weight 40 lbs with trulicity in the past  Weight loss recommended medically for her as she has MARTITA on cpap, afibb, migraines, dyslipidemia, GERD, elevated Liver enzymes, COPD, degenerative disc disease and arthritis.     Main carb in diet is rice and potatoes.   She recently started going to the gym.   I advised decreased any sugars, refines carbohydrates, increase lean protein, vegetables  Drink 64 oz of water, add decaf teas for stress relief    Discusses topamax for binge eating as well as to help as a maintenance for migraine prophylaxis as discussed with her at her neurologist visit.     She would like to start this.   Rx for topamax 50 mg bid provided.     Ref for skin cancer screening provided  She has ref for screening colonoscopy and screening mammogram in place. Will print these so she can call and schedule.   Follow up in 3 months.          Current medicines (including changes today)  Current Outpatient Medications   Medication Sig Dispense Refill    topiramate (TOPAMAX) 50 MG tablet Take 1 Tablet by mouth 2 times a day. 180 Tablet 3    methocarbamol (ROBAXIN) 500 MG Tab Take 1 Tablet by mouth 2 times a day as needed (headaches, migraines). 60 Tablet 5    ipratropium-albuterol (DUONEB) 0.5-2.5 (3) MG/3ML nebulizer solution Take 3 mL by nebulization every four hours as needed for Shortness of Breath. 360 Each 3    losartan (COZAAR) 50 MG Tab Take 1 Tablet by mouth every day.      trazodone (DESYREL) 300 MG tablet Take 300 mg by mouth at bedtime as needed.      Home Care Oxygen 3 lpm      metoprolol (TOPROL-XL) 200 MG XL tablet Take 1 Tablet by mouth every day.      lubiprostone (AMITIZA) 24 MCG capsule Take 1 Capsule by mouth 2 times a day with meals. 60 Capsule 0    NON SPECIFIED Nebulizer 1 Each 0     No current facility-administered  "medications for this visit.     She  has a past medical history of Allergic rhinitis, Anemia, Anxiety, Arthritis, Atrial fibrillation (HCC), Back pain, Bipolar affective disorder (HCC), Breath shortness, Bronchitis, Cataract, Chest tightness, Chickenpox, Constipation, COPD (chronic obstructive pulmonary disease) (HCC), Daytime sleepiness, Depression, Diabetes (HCC), Dyspnea on exertion (10/25/2020), GERD (gastroesophageal reflux disease), Hyperlipidemia, Hypertension, Hypoxia (12/31/2018), IBD (inflammatory bowel disease), Indigestion, Lumbar disc disease, Menopause, Migraine, Morning headache, Murmur (2/23/2021), Nasal drainage, Obesity, Pneumonia, Primary osteoarthritis of right hip (4/26/2021), Pulmonary emphysema (HCC), Shortness of breath, Sinusitis, Sleep apnea, Thyroid disease, Wears glasses, and Wheezing.    She has no past medical history of Addisons disease ([facility]), Adrenal disorder ([facility]), Asthma, Blood transfusion without reported diagnosis, Breast cancer ([facility]), Cancer (HCC), CHF (congestive heart failure) (HCC), Clotting disorder (HCC), Cushings syndrome (HCC), Glaucoma, Heart attack (HCC), HIV (human immunodeficiency virus infection) (HCC), Kidney disease, Seizure (HCC), Stroke (HCC), or Substance abuse (HCC).    ROS  No chest pain, no shortness of breath, no abdominal pain       Objective:     /78 (BP Location: Left arm, Patient Position: Sitting, BP Cuff Size: Adult long)   Pulse 60   Temp 36.4 °C (97.6 °F) (Temporal)   Resp 16   Ht 1.626 m (5' 4\")   Wt 118 kg (260 lb)   SpO2 97%  Body mass index is 44.63 kg/m².   Physical Exam:  Constitutional: Alert, no distress.  Skin: Warm, dry, good turgor, no rashes in visible areas.  Eye: Equal, round and reactive, conjunctiva clear, lids normal.  ENMT: Lips without lesions, good dentition, oropharynx clear.  Neck: Trachea midline, no masses, no thyromegaly. No cervical or supraclavicular lymphadenopathy  Respiratory: Unlabored respiratory effort, " lungs clear to auscultation, no wheezes, no ronchi.  Cardiovascular: Normal S1, S2, no murmur, no edema.  Abdomen: Soft, non-tender, no masses, no hepatosplenomegaly.  Psych: Alert and oriented x3, normal affect and mood.    Assessment and Plan:   The following treatment plan was discussed    1. History of tobacco use disorder  - REFERRAL TO LUNG CANCER SCREENING PROGRAM    2. Skin cancer screening  - Referral to Dermatology    3. Morbid obesity with BMI of 40.0-44.9, adult (HCC)    Other orders  - topiramate (TOPAMAX) 50 MG tablet; Take 1 Tablet by mouth 2 times a day.  Dispense: 180 Tablet; Refill: 3      Followup: Return in about 3 months (around 11/14/2024) for print info for pt to call for colonoscopy and mammo pls.

## 2024-08-16 ENCOUNTER — TELEPHONE (OUTPATIENT)
Dept: HEALTH INFORMATION MANAGEMENT | Facility: OTHER | Age: 69
End: 2024-08-16
Payer: MEDICARE

## 2024-08-16 NOTE — TELEPHONE ENCOUNTER
Outcome: Left message    LVM with Direct line for call back in regards to Lung CA Screen Eligibility Q&A/  Warm Transfer to Patient Outreach [phone]

## 2024-08-21 NOTE — TELEPHONE ENCOUNTER
Outcome: Pt not eligible for program. Pt stated quit 30 yrs ago    Please transfer to Patient Outreach Team at [phone] when patient returns call.      Attempt # 2

## 2024-09-25 ENCOUNTER — OFFICE VISIT (OUTPATIENT)
Dept: MEDICAL GROUP | Facility: PHYSICIAN GROUP | Age: 69
End: 2024-09-25
Payer: MEDICARE

## 2024-09-25 VITALS
RESPIRATION RATE: 14 BRPM | OXYGEN SATURATION: 99 % | BODY MASS INDEX: 42.34 KG/M2 | WEIGHT: 248 LBS | HEART RATE: 57 BPM | HEIGHT: 64 IN | TEMPERATURE: 96.8 F | SYSTOLIC BLOOD PRESSURE: 128 MMHG | DIASTOLIC BLOOD PRESSURE: 82 MMHG

## 2024-09-25 DIAGNOSIS — M79.671 RIGHT FOOT PAIN: ICD-10-CM

## 2024-09-25 DIAGNOSIS — E66.01 MORBID OBESITY WITH BMI OF 40.0-44.9, ADULT (HCC): ICD-10-CM

## 2024-09-25 PROCEDURE — 99214 OFFICE O/P EST MOD 30 MIN: CPT | Performed by: FAMILY MEDICINE

## 2024-09-25 PROCEDURE — 3074F SYST BP LT 130 MM HG: CPT | Performed by: FAMILY MEDICINE

## 2024-09-25 PROCEDURE — 3079F DIAST BP 80-89 MM HG: CPT | Performed by: FAMILY MEDICINE

## 2024-09-25 RX ORDER — MEMANTINE HYDROCHLORIDE 10 MG/1
TABLET ORAL
COMMUNITY
Start: 2024-07-16 | End: 2024-09-25

## 2024-09-25 RX ORDER — ATORVASTATIN CALCIUM 80 MG/1
80 TABLET, FILM COATED ORAL
COMMUNITY
Start: 2024-08-20 | End: 2024-09-25

## 2024-09-25 RX ORDER — ALBUTEROL SULFATE 0.83 MG/ML
SOLUTION RESPIRATORY (INHALATION)
COMMUNITY
End: 2024-09-25

## 2024-09-25 RX ORDER — AMOXICILLIN 500 MG/1
CAPSULE ORAL
COMMUNITY
End: 2024-09-25

## 2024-09-25 RX ORDER — RISPERIDONE 3 MG/1
TABLET ORAL
COMMUNITY
Start: 2024-09-13 | End: 2024-09-25

## 2024-09-25 RX ORDER — ISOSORBIDE DINITRATE 5 MG/1
1 TABLET ORAL 2 TIMES DAILY
COMMUNITY
Start: 2024-08-30 | End: 2024-09-25

## 2024-09-25 RX ORDER — RISPERIDONE 2 MG/1
1 TABLET ORAL 2 TIMES DAILY
COMMUNITY
End: 2024-09-25

## 2024-09-25 RX ORDER — BENZONATATE 100 MG/1
CAPSULE ORAL
COMMUNITY
End: 2024-09-25

## 2024-09-25 RX ORDER — RISPERIDONE 4 MG/1
4 TABLET ORAL
COMMUNITY
Start: 2024-07-07 | End: 2024-09-25

## 2024-09-25 RX ORDER — FLUTICASONE FUROATE, UMECLIDINIUM BROMIDE AND VILANTEROL TRIFENATATE 200; 62.5; 25 UG/1; UG/1; UG/1
1 POWDER RESPIRATORY (INHALATION) DAILY
COMMUNITY
End: 2024-09-25

## 2024-09-25 RX ORDER — AMOXICILLIN 875 MG
TABLET ORAL
COMMUNITY
End: 2024-09-25

## 2024-09-25 RX ORDER — PANTOPRAZOLE SODIUM 40 MG/1
TABLET, DELAYED RELEASE ORAL
COMMUNITY
End: 2024-09-25

## 2024-09-25 RX ORDER — ATOGEPANT 60 MG/1
1 TABLET ORAL DAILY
COMMUNITY
Start: 2024-09-12 | End: 2024-09-25

## 2024-09-25 RX ORDER — AZITHROMYCIN 250 MG/1
TABLET, FILM COATED ORAL
COMMUNITY
End: 2024-09-25

## 2024-09-25 RX ORDER — ATORVASTATIN CALCIUM 40 MG/1
1 TABLET, FILM COATED ORAL
COMMUNITY
End: 2024-09-25

## 2024-09-25 RX ORDER — DIAZEPAM 10 MG
TABLET ORAL
COMMUNITY
Start: 2024-07-17 | End: 2024-09-25

## 2024-09-25 RX ORDER — DULOXETIN HYDROCHLORIDE 20 MG/1
CAPSULE, DELAYED RELEASE ORAL
COMMUNITY
Start: 2024-09-02 | End: 2024-09-25

## 2024-09-25 RX ORDER — MELOXICAM 15 MG/1
TABLET ORAL
COMMUNITY
Start: 2024-07-07 | End: 2024-09-25

## 2024-09-25 RX ORDER — BUDESONIDE AND FORMOTEROL FUMARATE DIHYDRATE 160; 4.5 UG/1; UG/1
2 AEROSOL RESPIRATORY (INHALATION) 2 TIMES DAILY
COMMUNITY
End: 2024-09-25

## 2024-09-25 RX ORDER — PREDNISONE 10 MG/1
TABLET ORAL
COMMUNITY
End: 2024-09-25

## 2024-09-25 RX ORDER — BACLOFEN 20 MG/1
TABLET ORAL
COMMUNITY
End: 2024-09-25

## 2024-09-25 RX ORDER — CARBAMAZEPINE 200 MG/1
TABLET ORAL
COMMUNITY
End: 2024-09-25

## 2024-09-25 ASSESSMENT — FIBROSIS 4 INDEX: FIB4 SCORE: 1.17

## 2024-09-25 NOTE — PROGRESS NOTES
Subjective:   Herlinda Gill is a 68 y.o. female here today for evaluation and management of:     Right foot pain  Right heel pain, worse with more walking  Not worse with first steps out of bed.   Likely OA  Her mother has gout but pt has no redness, swelling or pain to touch of the skin.   Has no pain to achilles tendon.   Usually would walk barefoot at home.  trial cushioned/gel insert shoes, ice, stretches and topical voltaren gel and can try 200mg ibuprofen as she has no hx of GI bleed, stomach surgery or use of blood thinners.   If not improving by next appt in Nov, check xray for heel spurs      Morbid obesity with BMI of 40.0-44.9, adult (HCC)  Likely contributing to OA and foot pain.   Continue healthy diet changes, activity as tolerated.            Current medicines (including changes today)  Current Outpatient Medications   Medication Sig Dispense Refill    topiramate (TOPAMAX) 50 MG tablet Take 1 Tablet by mouth 2 times a day. 180 Tablet 3    methocarbamol (ROBAXIN) 500 MG Tab Take 1 Tablet by mouth 2 times a day as needed (headaches, migraines). 60 Tablet 5    ipratropium-albuterol (DUONEB) 0.5-2.5 (3) MG/3ML nebulizer solution Take 3 mL by nebulization every four hours as needed for Shortness of Breath. 360 Each 3    losartan (COZAAR) 50 MG Tab Take 1 Tablet by mouth every day.      trazodone (DESYREL) 300 MG tablet Take 300 mg by mouth at bedtime as needed.      Home Care Oxygen 3 lpm      metoprolol (TOPROL-XL) 200 MG XL tablet Take 1 Tablet by mouth every day.      lubiprostone (AMITIZA) 24 MCG capsule Take 1 Capsule by mouth 2 times a day with meals. 60 Capsule 0    NON SPECIFIED Nebulizer 1 Each 0     No current facility-administered medications for this visit.     She  has a past medical history of Allergic rhinitis, Anemia, Anxiety, Arthritis, Atrial fibrillation (HCC), Back pain, Bipolar affective disorder (HCC), Breath shortness, Bronchitis, Cataract, Chest tightness, Chickenpox, Constipation,  "COPD (chronic obstructive pulmonary disease) ([facility]), Daytime sleepiness, Depression, Diabetes ([facility]), Dyspnea on exertion (10/25/2020), GERD (gastroesophageal reflux disease), Hyperlipidemia, Hypertension, Hypoxia (12/31/2018), IBD (inflammatory bowel disease), Indigestion, Lumbar disc disease, Menopause, Migraine, Morning headache, Murmur (2/23/2021), Nasal drainage, Obesity, Pneumonia, Primary osteoarthritis of right hip (4/26/2021), Pulmonary emphysema ([facility]), Shortness of breath, Sinusitis, Sleep apnea, Thyroid disease, Wears glasses, and Wheezing.    She has no past medical history of Addisons disease ([facility]), Adrenal disorder ([facility]), Asthma, Blood transfusion without reported diagnosis, Breast cancer ([facility]), Cancer ([facility]), CHF (congestive heart failure) ([facility]), Clotting disorder ([facility]), Cushings syndrome ([facility]), Glaucoma, Heart attack ([facility]), HIV (human immunodeficiency virus infection) ([facility]), Kidney disease, Seizure ([facility]), Stroke ([facility]), or Substance abuse ([facility]).    ROS  No chest pain, no shortness of breath, no abdominal pain       Objective:     /82   Pulse (!) 57   Temp 36 °C (96.8 °F) (Temporal)   Resp 14   Ht 1.626 m (5' 4\")   Wt 112 kg (248 lb)   SpO2 99%  Body mass index is 42.57 kg/m².   Physical Exam:  Constitutional: Alert, no distress, well-groomed.  Skin: No rashes in visible areas.  Eye: Round. Conjunctiva clear, lids normal. No icterus.   ENMT: Lips pink without lesions, good dentition, moist mucous membranes. Phonation normal.  Neck: No masses, no thyromegaly. Moves freely without pain.  Respiratory: Unlabored respiratory effort, no cough or audible wheeze  Psych: Alert and oriented x3, normal affect and mood.  Right foot: no deformity, redness, swelling or TTP    Assessment and Plan:   The following treatment plan was discussed    1. Right foot pain    2. Morbid obesity with BMI of 40.0-44.9, adult ([facility])      Followup: No follow-ups on file.           "

## 2024-09-25 NOTE — ASSESSMENT & PLAN NOTE
Right heel pain, worse with more walking  Not worse with first steps out of bed.   Likely OA  Her mother has gout but pt has no redness, swelling or pain to touch of the skin.   Has no pain to achilles tendon.   Usually would walk barefoot at home.  trial cushioned/gel insert shoes, ice, stretches and topical voltaren gel and can try 200mg ibuprofen as she has no hx of GI bleed, stomach surgery or use of blood thinners.   If not improving by next appt in Nov, check xray for heel spurs

## 2025-03-27 ENCOUNTER — APPOINTMENT (OUTPATIENT)
Dept: MEDICAL GROUP | Facility: PHYSICIAN GROUP | Age: 70
End: 2025-03-27
Payer: MEDICARE

## 2025-05-06 ENCOUNTER — OFFICE VISIT (OUTPATIENT)
Dept: MEDICAL GROUP | Facility: PHYSICIAN GROUP | Age: 70
End: 2025-05-06
Payer: MEDICARE

## 2025-05-06 VITALS
WEIGHT: 230 LBS | SYSTOLIC BLOOD PRESSURE: 130 MMHG | BODY MASS INDEX: 39.27 KG/M2 | DIASTOLIC BLOOD PRESSURE: 78 MMHG | HEIGHT: 64 IN | TEMPERATURE: 97.8 F | RESPIRATION RATE: 16 BRPM | HEART RATE: 101 BPM | OXYGEN SATURATION: 99 %

## 2025-05-06 DIAGNOSIS — Z79.01 LONG TERM (CURRENT) USE OF ANTICOAGULANTS: ICD-10-CM

## 2025-05-06 DIAGNOSIS — Z12.31 ENCOUNTER FOR SCREENING MAMMOGRAM FOR MALIGNANT NEOPLASM OF BREAST: ICD-10-CM

## 2025-05-06 DIAGNOSIS — R73.03 PREDIABETES: ICD-10-CM

## 2025-05-06 DIAGNOSIS — Z12.11 COLON CANCER SCREENING: ICD-10-CM

## 2025-05-06 DIAGNOSIS — E78.1 HYPERTRIGLYCERIDEMIA: ICD-10-CM

## 2025-05-06 DIAGNOSIS — E55.9 VITAMIN D DEFICIENCY: ICD-10-CM

## 2025-05-06 PROCEDURE — 99214 OFFICE O/P EST MOD 30 MIN: CPT | Mod: 25 | Performed by: FAMILY MEDICINE

## 2025-05-06 PROCEDURE — 3078F DIAST BP <80 MM HG: CPT | Performed by: FAMILY MEDICINE

## 2025-05-06 PROCEDURE — 20552 NJX 1/MLT TRIGGER POINT 1/2: CPT | Performed by: FAMILY MEDICINE

## 2025-05-06 PROCEDURE — 3075F SYST BP GE 130 - 139MM HG: CPT | Performed by: FAMILY MEDICINE

## 2025-05-06 RX ORDER — DULOXETIN HYDROCHLORIDE 20 MG/1
CAPSULE, DELAYED RELEASE ORAL
COMMUNITY
Start: 2025-04-24

## 2025-05-06 RX ORDER — RIVAROXABAN 20 MG/1
20 TABLET, FILM COATED ORAL
Qty: 90 TABLET | Refills: 3 | Status: SHIPPED | OUTPATIENT
Start: 2025-05-06

## 2025-05-06 RX ORDER — PREDNISONE 20 MG/1
TABLET ORAL
COMMUNITY
Start: 2025-02-13

## 2025-05-06 RX ORDER — ATORVASTATIN CALCIUM 80 MG/1
TABLET, FILM COATED ORAL
COMMUNITY
Start: 2025-02-10

## 2025-05-06 RX ORDER — TRAZODONE HYDROCHLORIDE 100 MG/1
200 TABLET ORAL
COMMUNITY
Start: 2025-04-30 | End: 2025-05-06

## 2025-05-06 RX ORDER — DIAZEPAM 5 MG/1
TABLET ORAL
COMMUNITY
Start: 2025-04-02

## 2025-05-06 RX ORDER — LOSARTAN POTASSIUM 100 MG/1
100 TABLET ORAL DAILY
Qty: 100 TABLET | Refills: 3 | Status: SHIPPED | OUTPATIENT
Start: 2025-05-06 | End: 2026-06-10

## 2025-05-06 RX ORDER — RISPERIDONE 3 MG/1
TABLET ORAL
COMMUNITY
Start: 2025-04-30

## 2025-05-06 RX ORDER — RIVAROXABAN 20 MG/1
TABLET, FILM COATED ORAL
COMMUNITY
End: 2025-05-06

## 2025-05-06 RX ORDER — CARVEDILOL 12.5 MG/1
12.5 TABLET ORAL
COMMUNITY
Start: 2025-02-13 | End: 2025-05-06

## 2025-05-06 RX ORDER — ATOGEPANT 60 MG/1
1 TABLET ORAL DAILY
COMMUNITY
End: 2025-05-06

## 2025-05-06 RX ORDER — BENZONATATE 200 MG/1
CAPSULE ORAL
COMMUNITY
Start: 2025-02-13

## 2025-05-06 RX ORDER — AZITHROMYCIN 500 MG/1
TABLET, FILM COATED ORAL
COMMUNITY
Start: 2025-02-13

## 2025-05-06 RX ORDER — OSELTAMIVIR PHOSPHATE 75 MG/1
CAPSULE ORAL
COMMUNITY
Start: 2025-02-13

## 2025-05-06 RX ORDER — MEMANTINE HYDROCHLORIDE 10 MG/1
TABLET ORAL
COMMUNITY
Start: 2025-04-15

## 2025-05-06 RX ORDER — AMLODIPINE BESYLATE 10 MG/1
10 TABLET ORAL DAILY
COMMUNITY
Start: 2025-02-14 | End: 2025-05-06

## 2025-05-06 RX ORDER — ISOSORBIDE DINITRATE 5 MG/1
5 TABLET ORAL 2 TIMES DAILY
COMMUNITY
End: 2025-05-06

## 2025-05-06 RX ORDER — FLUTICASONE FUROATE, UMECLIDINIUM BROMIDE AND VILANTEROL TRIFENATATE 200; 62.5; 25 UG/1; UG/1; UG/1
1 POWDER RESPIRATORY (INHALATION) DAILY
COMMUNITY
Start: 2025-04-20 | End: 2025-05-06

## 2025-05-06 RX ADMIN — Medication 10 ML: at 14:34

## 2025-05-06 ASSESSMENT — FIBROSIS 4 INDEX: FIB4 SCORE: 1.43

## 2025-05-07 NOTE — PROGRESS NOTES
Subjective:   Herlinda Gill is a 69 y.o. female here today for evaluation and management of:     History of Present Illness  The patient is a 69-year-old female who presents for evaluation of hypertension, atrial fibrillation, and neck pain. She is here after a hospital admission for influenza and COPD exacerbation in 02/2025.    She reports an elevation in her blood pressure. Her current medication regimen includes losartan 50 mg and metoprolol 200 mg. She does not have a home blood pressure monitor.    She has been diagnosed with atrial fibrillation and was previously prescribed Xarelto, which she is not currently taking.    She experiences muscle tightness in the posterior aspect of her neck, which is associated with significant discomfort. This symptom persists despite the application of a heating pad and ice pack. She is on methocarbamol for muscle spasms.    She has a history of hypercholesterolemia. She is doing well on her bipolar medications. She is using CPAP without any issues. She reports no presence of blood in her stool or urine, and no episodes of nosebleeds.          Current medicines (including changes today)  Current Outpatient Medications   Medication Sig Dispense Refill    XARELTO 20 MG Tab tablet Take 1 Tablet by mouth with dinner. 90 Tablet 3    losartan (COZAAR) 100 MG Tab Take 1 Tablet by mouth every day. 100 Tablet 3    topiramate (TOPAMAX) 50 MG tablet Take 1 Tablet by mouth 2 times a day. 180 Tablet 3    methocarbamol (ROBAXIN) 500 MG Tab Take 1 Tablet by mouth 2 times a day as needed (headaches, migraines). 60 Tablet 5    ipratropium-albuterol (DUONEB) 0.5-2.5 (3) MG/3ML nebulizer solution Take 3 mL by nebulization every four hours as needed for Shortness of Breath. 360 Each 3    trazodone (DESYREL) 300 MG tablet Take 300 mg by mouth at bedtime as needed.      Home Care Oxygen 3 lpm      metoprolol (TOPROL-XL) 200 MG XL tablet Take 1 Tablet by mouth every day.      NON SPECIFIED  Nebulizer 1 Each 0    atorvastatin (LIPITOR) 80 MG tablet TAKE 1 TABLET BY MOUTH ONCE DAILY IN THE EVENING AT 9 PM (BEDTIME) (Patient not taking: Reported on 5/6/2025)      azithromycin (ZITHROMAX) 500 MG tablet TAKE 1 TABLET BY MOUTH ONCE DAILY FOR 3 DAYS (Patient not taking: Reported on 5/6/2025)      benzonatate (TESSALON) 200 MG capsule TAKE 1 CAPSULE BY MOUTH THREE TIMES DAILY AS NEEDED FOR COUGH FOR UP TO 14 DAYS (Patient not taking: Reported on 5/6/2025)      diazePAM (VALIUM) 5 MG Tab 1 tablet 2 hours before pet scan do not drive after taking orally once for 1 days (Patient not taking: Reported on 5/6/2025)      DULoxetine (CYMBALTA) 20 MG Cap DR Particles TAKE 2 CAPSULES BY MOUTH IN THE MORNING AND 1 AT BEDTIME (Patient not taking: Reported on 5/6/2025)      memantine (NAMENDA) 10 MG Tab TAKE 1 TABLET BY MOUTH ONCE DAILY FOR 14 DAYS THEN 1 TWICE DAILY THEREAFTER (Patient not taking: Reported on 5/6/2025)      oseltamivir (TAMIFLU) 75 MG Cap TAKE 1 CAPSULE BY MOUTH TWICE DAILY FOR 3 DOSES. START FIRST DOSE 2/13 AT 9 PM (Patient not taking: Reported on 5/6/2025)      predniSONE (DELTASONE) 20 MG Tab TAKE 2 TABLETS BY MOUTH ONCE DAILY FOR 4 DAYS (Patient not taking: Reported on 5/6/2025)      risperiDONE (RISPERDAL) 3 MG Tab TAKE 1 TABLET BY MOUTH AT BEDTIME FOR CLEAR THINKING AND MOOD STABILIZATION (Patient not taking: Reported on 5/6/2025)       No current facility-administered medications for this visit.     She  has a past medical history of Allergic rhinitis, Anemia, Anxiety, Arthritis, Atrial fibrillation ([facility]), Back pain, Bipolar affective disorder ([facility]), Breath shortness, Bronchitis, Cataract, Chest tightness, Chickenpox, Constipation, COPD (chronic obstructive pulmonary disease) ([facility]), Daytime sleepiness, Depression, Diabetes ([facility]), Dyspnea on exertion (10/25/2020), GERD (gastroesophageal reflux disease), Hyperlipidemia, Hypertension, Hypoxia (12/31/2018), IBD (inflammatory bowel disease),  "Indigestion, Lumbar disc disease, Menopause, Migraine, Morning headache, Murmur (2/23/2021), Nasal drainage, Obesity, Pneumonia, Primary osteoarthritis of right hip (4/26/2021), Pulmonary emphysema (HCC), Shortness of breath, Sinusitis, Sleep apnea, Thyroid disease, Wears glasses, and Wheezing.    She has no past medical history of Addisons disease (HCC), Adrenal disorder (HCC), Asthma, Blood transfusion without reported diagnosis, Breast cancer (HCC), Cancer (HCC), CHF (congestive heart failure) (HCC), Clotting disorder (HCC), Cushings syndrome (HCC), Glaucoma, Heart attack (HCC), HIV (human immunodeficiency virus infection) (HCC), Kidney disease, Seizure (HCC), Stroke (HCC), or Substance abuse (HCC).    ROS  No chest pain, no shortness of breath, no abdominal pain       Objective:     /78   Pulse (!) 101   Temp 36.6 °C (97.8 °F) (Temporal)   Resp 16   Ht 1.626 m (5' 4\")   Wt 104 kg (230 lb)   SpO2 99%  Body mass index is 39.48 kg/m².   Physical Exam:  Constitutional: Alert, no distress.  Skin: Warm, dry, good turgor, no rashes in visible areas.  Eye: Equal, round and reactive, conjunctiva clear, lids normal.  ENMT: Lips without lesions, good dentition, oropharynx clear.  Neck: Trachea midline, no masses, no thyromegaly. No cervical or supraclavicular lymphadenopathy  Respiratory: Unlabored respiratory effort, lungs clear to auscultation, no wheezes, no ronchi.  Cardiovascular: Normal S1, S2, no murmur, no edema.  Abdomen: Soft, non-tender, no masses, no hepatosplenomegaly.  Psych: Alert and oriented x3, normal affect and mood.       The following treatment plan was discussed  Assessment & Plan  1. Hypertension.  - Blood pressure is currently managed with losartan 50 mg and metoprolol 200 mg.  - Blood pressure today is 130/78, with a slightly elevated pulse.  - Discussed increasing losartan to 100 mg to better control blood pressure.  - Advised to monitor blood pressure at home.    2. Atrial " fibrillation.  - Not currently on Xarelto, which is necessary to reduce the risk of stroke.  - Reviewed the necessity of Xarelto due to A-fib.  - Prescription for Xarelto 20 mg daily will be provided.    3. Neck pain.  - Reports persistent neck pain despite using methocarbamol, heating pads, and ice packs.  - Physical exam findings indicate tightness in the neck muscles.  - Administered trigger point injections to the paraspinal muscles bilaterally at the neck level, which provided significant relief.  - Discussed ongoing management and potential need for further interventions if pain persists.    4. Health maintenance.  - Oxygen saturation is at 99% on 3 L.  - Laboratory results indicate normal blood cell count, kidney function, liver function, and A1c levels.  - Cholesterol levels are elevated.  - An order for a mammogram will be placed today.  - Cologuard test will be ordered for colon cancer screening.  - Lung cancer screening CT scan was discussed but declined due to claustrophobia.  - Additional laboratory tests, including an A1c, will be conducted this year.    Follow-up  The patient will follow up in 3 months.    PROCEDURE  Procedure: Trigger point injections to paraspinal muscles bilaterally at the neck level    All questions were answered and agreement to proceed was given after the following Pre-Procedure details were reviewed:  - Risks and Benefits: Discussed potential risks such as infection, bleeding, and temporary increase in pain. Benefits include pain relief and improved muscle function.  - Alternative Options: Discussed alternative options including physical therapy, oral medications, and topical treatments.  - Side effects: Discussed possible side effects such as soreness at injection site, temporary numbness, and allergic reactions.  - Consent: Verbal consent obtained from the patient.    Intra-Procedure:  - Time-Out: Conducted to confirm patient identity, procedure, and site.  - Site  Preparation: Cleaned the injection site with antiseptic solution.  - Anesthesia: Local anesthetic administered at the injection site.  - Medication: Administered numbing medication via injection.    Post-Procedure:  - Tolerance Level: Patient tolerated the procedure well.  - Home Care Instructions: Advised patient to monitor the injection site for signs of infection, apply ice if needed, and avoid strenuous activities for the next 24 hours.    1. Encounter for screening mammogram for malignant neoplasm of breast  - MA-SCREENING MAMMO BILAT W/TOMOSYNTHESIS W/CAD; Future    2. Colon cancer screening  - Cologuard® colon cancer screening    3. Hypertriglyceridemia  - Comp Metabolic Panel; Future  - Lipid Profile; Future    4. Vitamin D deficiency  - VITAMIN D,25 HYDROXY (DEFICIENCY); Future    5. Prediabetes  - HEMOGLOBIN A1C; Future  - Comp Metabolic Panel; Future    6. Long term (current) use of anticoagulants  - CBC WITHOUT DIFFERENTIAL; Future    Other orders  - atorvastatin (LIPITOR) 80 MG tablet; TAKE 1 TABLET BY MOUTH ONCE DAILY IN THE EVENING AT 9 PM (BEDTIME) (Patient not taking: Reported on 5/6/2025)  - azithromycin (ZITHROMAX) 500 MG tablet; TAKE 1 TABLET BY MOUTH ONCE DAILY FOR 3 DAYS (Patient not taking: Reported on 5/6/2025)  - benzonatate (TESSALON) 200 MG capsule; TAKE 1 CAPSULE BY MOUTH THREE TIMES DAILY AS NEEDED FOR COUGH FOR UP TO 14 DAYS (Patient not taking: Reported on 5/6/2025)  - diazePAM (VALIUM) 5 MG Tab; 1 tablet 2 hours before pet scan do not drive after taking orally once for 1 days (Patient not taking: Reported on 5/6/2025)  - DULoxetine (CYMBALTA) 20 MG Cap DR Particles; TAKE 2 CAPSULES BY MOUTH IN THE MORNING AND 1 AT BEDTIME (Patient not taking: Reported on 5/6/2025)  - memantine (NAMENDA) 10 MG Tab; TAKE 1 TABLET BY MOUTH ONCE DAILY FOR 14 DAYS THEN 1 TWICE DAILY THEREAFTER (Patient not taking: Reported on 5/6/2025)  - oseltamivir (TAMIFLU) 75 MG Cap; TAKE 1 CAPSULE BY MOUTH TWICE DAILY  FOR 3 DOSES. START FIRST DOSE 2/13 AT 9 PM (Patient not taking: Reported on 5/6/2025)  - predniSONE (DELTASONE) 20 MG Tab; TAKE 2 TABLETS BY MOUTH ONCE DAILY FOR 4 DAYS (Patient not taking: Reported on 5/6/2025)  - risperiDONE (RISPERDAL) 3 MG Tab; TAKE 1 TABLET BY MOUTH AT BEDTIME FOR CLEAR THINKING AND MOOD STABILIZATION (Patient not taking: Reported on 5/6/2025)  - XARELTO 20 MG Tab tablet; Take 1 Tablet by mouth with dinner.  Dispense: 90 Tablet; Refill: 3  - losartan (COZAAR) 100 MG Tab; Take 1 Tablet by mouth every day.  Dispense: 100 Tablet; Refill: 3  - lidocaine (Xylocaine) 1 % injection 10 mL      Followup: Return in about 3 months (around 8/6/2025) for Hypertension.  Verbal consent was acquired by the patient to use [employer] ambient listening note generation during this visit Yes

## 2025-06-05 LAB — NONINV COLON CA DNA+OCC BLD SCRN STL QL: NEGATIVE

## 2025-06-20 ENCOUNTER — RESULTS FOLLOW-UP (OUTPATIENT)
Dept: MEDICAL GROUP | Facility: PHYSICIAN GROUP | Age: 70
End: 2025-06-20

## 2025-06-26 ENCOUNTER — HOSPITAL ENCOUNTER (OUTPATIENT)
Facility: MEDICAL CENTER | Age: 70
End: 2025-06-26
Attending: FAMILY MEDICINE
Payer: MEDICARE

## 2025-06-26 DIAGNOSIS — Z12.31 ENCOUNTER FOR SCREENING MAMMOGRAM FOR MALIGNANT NEOPLASM OF BREAST: ICD-10-CM

## 2025-06-26 PROCEDURE — 77063 BREAST TOMOSYNTHESIS BI: CPT

## 2025-07-07 ENCOUNTER — HOSPITAL ENCOUNTER (OUTPATIENT)
Dept: RADIOLOGY | Facility: MEDICAL CENTER | Age: 70
End: 2025-07-07
Payer: MEDICARE

## 2025-07-22 ENCOUNTER — OFFICE VISIT (OUTPATIENT)
Dept: MEDICAL GROUP | Facility: PHYSICIAN GROUP | Age: 70
End: 2025-07-22
Payer: MEDICARE

## 2025-07-22 VITALS
DIASTOLIC BLOOD PRESSURE: 68 MMHG | BODY MASS INDEX: 38.76 KG/M2 | TEMPERATURE: 98.6 F | HEART RATE: 98 BPM | RESPIRATION RATE: 20 BRPM | HEIGHT: 64 IN | WEIGHT: 227 LBS | SYSTOLIC BLOOD PRESSURE: 110 MMHG | OXYGEN SATURATION: 95 %

## 2025-07-22 DIAGNOSIS — J44.9 CHRONIC OBSTRUCTIVE PULMONARY DISEASE, UNSPECIFIED COPD TYPE (HCC): ICD-10-CM

## 2025-07-22 DIAGNOSIS — E78.5 DYSLIPIDEMIA: ICD-10-CM

## 2025-07-22 DIAGNOSIS — R74.8 ELEVATED LIVER ENZYMES: ICD-10-CM

## 2025-07-22 DIAGNOSIS — F17.210 NICOTINE DEPENDENCE, CIGARETTES, UNCOMPLICATED: Primary | ICD-10-CM

## 2025-07-22 DIAGNOSIS — R73.03 PREDIABETES: ICD-10-CM

## 2025-07-22 DIAGNOSIS — I10 ESSENTIAL HYPERTENSION: ICD-10-CM

## 2025-07-22 DIAGNOSIS — G43.109 MIGRAINE WITH AURA AND WITHOUT STATUS MIGRAINOSUS, NOT INTRACTABLE: ICD-10-CM

## 2025-07-22 DIAGNOSIS — E55.9 VITAMIN D DEFICIENCY: ICD-10-CM

## 2025-07-22 PROCEDURE — 3074F SYST BP LT 130 MM HG: CPT | Performed by: FAMILY MEDICINE

## 2025-07-22 PROCEDURE — 99214 OFFICE O/P EST MOD 30 MIN: CPT | Performed by: FAMILY MEDICINE

## 2025-07-22 PROCEDURE — 3078F DIAST BP <80 MM HG: CPT | Performed by: FAMILY MEDICINE

## 2025-07-22 ASSESSMENT — FIBROSIS 4 INDEX: FIB4 SCORE: 1.43

## 2025-07-22 NOTE — ASSESSMENT & PLAN NOTE
Pt has chronic resp failure on continuous oxygen supplementation 3L  Uses cpap at night for suzy,   Uses duoneb, abuterol as needed.   Followed by pulmonology.

## 2025-07-22 NOTE — ASSESSMENT & PLAN NOTE
She has chronic migraines since childhood  Takes tylenol 3000 mg a day about 6 pills  Already on a bb metoprolol  Tried gabapentin, it did not work.   Is already on topamax, methocarbamol  Rx for sumatriptan provided.

## 2025-07-22 NOTE — PROGRESS NOTES
Subjective:   Herlinda Gill is a 69 y.o. female here today for evaluation and management of:     Migraine with aura and without status migrainosus, not intractable  She has chronic migraines since childhood  Takes tylenol 3000 mg a day about 6 pills  Already on a bb metoprolol  Tried gabapentin, it did not work.   Is already on topamax, methocarbamol  Rx for sumatriptan provided.     Chronic obstructive pulmonary disease (HCC)  Pt has chronic resp failure on continuous oxygen supplementation 3L  Uses cpap at night for suzy,   Uses duoneb, abuterol as needed.   Followed by pulmonology.          Current medicines (including changes today)  Current Medications[1]  She  has a past medical history of Allergic rhinitis, Anemia, Anxiety, Arthritis, Atrial fibrillation ([facility]), Back pain, Bipolar affective disorder ([facility]), Breath shortness, Bronchitis, Cataract, Chest tightness, Chickenpox, Constipation, COPD (chronic obstructive pulmonary disease) ([facility]), Daytime sleepiness, Depression, Diabetes ([facility]), Dyspnea on exertion (10/25/2020), GERD (gastroesophageal reflux disease), Hyperlipidemia, Hypertension, Hypoxia (12/31/2018), IBD (inflammatory bowel disease), Indigestion, Lumbar disc disease, Menopause, Migraine, Morning headache, Murmur (2/23/2021), Nasal drainage, Obesity, Pneumonia, Primary osteoarthritis of right hip (4/26/2021), Pulmonary emphysema ([facility]), Shortness of breath, Sinusitis, Sleep apnea, Thyroid disease, Wears glasses, and Wheezing.    She has no past medical history of Addisons disease ([facility]), Adrenal disorder ([facility]), Asthma, Blood transfusion without reported diagnosis, Breast cancer ([facility]), Cancer (HCC), CHF (congestive heart failure) ([facility]), Clotting disorder (HCC), Cushings syndrome (HCC), Glaucoma, Heart attack (HCC), HIV (human immunodeficiency virus infection) ([facility]), Kidney disease, Seizure (HCC), Stroke (HCC), or Substance abuse ([facility]).    ROS  No chest pain, no shortness of breath, no abdominal pain        "Objective:     /68 (BP Location: Left arm, Patient Position: Sitting, BP Cuff Size: Adult long)   Pulse 98   Temp 37 °C (98.6 °F) (Temporal)   Resp 20   Ht 1.626 m (5' 4\")   Wt 103 kg (227 lb)   SpO2 95%  Body mass index is 38.96 kg/m².   Physical Exam:  Constitutional: Alert, no distress.  Skin: Warm, dry, good turgor, no rashes in visible areas.  Eye: Equal, round and reactive, conjunctiva clear, lids normal.  ENMT: Lips without lesions, good dentition, oropharynx clear.  Neck: Trachea midline, no masses, no thyromegaly. No cervical or supraclavicular lymphadenopathy  Respiratory: Unlabored respiratory effort, lungs clear to auscultation, no wheezes, no ronchi.  Cardiovascular: Normal S1, S2, no murmur, no edema.  Abdomen: Soft, non-tender, no masses, no hepatosplenomegaly.  Psych: Alert and oriented x3, normal affect and mood.    Assessment and Plan:   The following treatment plan was discussed    1. Nicotine dependence, cigarettes, uncomplicated  - REFERRAL TO LUNG CANCER SCREENING PROGRAM    2. Chronic obstructive pulmonary disease, unspecified COPD type (HCC)  - PULMONARY FUNCTION TESTS -Test requested: Complete Pulmonary Function Test; Future    3. Migraine with aura and without status migrainosus, not intractable      Followup: No follow-ups on file.                [1]   Current Outpatient Medications   Medication Sig Dispense Refill    DULoxetine (CYMBALTA) 20 MG Cap DR Particles TAKE 2 CAPSULES BY MOUTH IN THE MORNING AND 1 AT BEDTIME      memantine (NAMENDA) 10 MG Tab TAKE 1 TABLET BY MOUTH ONCE DAILY FOR 14 DAYS THEN 1 TWICE DAILY THEREAFTER      risperiDONE (RISPERDAL) 3 MG Tab TAKE 1 TABLET BY MOUTH AT BEDTIME FOR CLEAR THINKING AND MOOD STABILIZATION      XARELTO 20 MG Tab tablet Take 1 Tablet by mouth with dinner. 90 Tablet 3    losartan (COZAAR) 100 MG Tab Take 1 Tablet by mouth every day. 100 Tablet 3    topiramate (TOPAMAX) 50 MG tablet Take 1 Tablet by mouth 2 times a day. 180 Tablet " 3    methocarbamol (ROBAXIN) 500 MG Tab Take 1 Tablet by mouth 2 times a day as needed (headaches, migraines). 60 Tablet 5    ipratropium-albuterol (DUONEB) 0.5-2.5 (3) MG/3ML nebulizer solution Take 3 mL by nebulization every four hours as needed for Shortness of Breath. 360 Each 3    trazodone (DESYREL) 300 MG tablet Take 300 mg by mouth at bedtime as needed.      Home Care Oxygen 3 lpm      metoprolol (TOPROL-XL) 200 MG XL tablet Take 1 Tablet by mouth every day.      NON SPECIFIED Nebulizer 1 Each 0     No current facility-administered medications for this visit.

## 2025-07-25 ENCOUNTER — TELEPHONE (OUTPATIENT)
Dept: HEALTH INFORMATION MANAGEMENT | Facility: OTHER | Age: 70
End: 2025-07-25
Payer: MEDICARE

## 2025-07-25 NOTE — TELEPHONE ENCOUNTER
Outcome: Declined     Please transfer to [facility] Outbound [phone] when patient returns call.    Attempt 1

## 2025-08-05 RX ORDER — TOPIRAMATE 50 MG/1
50 TABLET, FILM COATED ORAL 2 TIMES DAILY
Qty: 180 TABLET | Refills: 3 | Status: SHIPPED | OUTPATIENT
Start: 2025-08-05